# Patient Record
Sex: MALE | Race: WHITE | NOT HISPANIC OR LATINO | Employment: FULL TIME | URBAN - METROPOLITAN AREA
[De-identification: names, ages, dates, MRNs, and addresses within clinical notes are randomized per-mention and may not be internally consistent; named-entity substitution may affect disease eponyms.]

---

## 2017-07-10 ENCOUNTER — TRANSFERRED RECORDS (OUTPATIENT)
Dept: HEALTH INFORMATION MANAGEMENT | Facility: CLINIC | Age: 37
End: 2017-07-10

## 2017-10-25 ENCOUNTER — TELEPHONE (OUTPATIENT)
Dept: FAMILY MEDICINE | Facility: CLINIC | Age: 37
End: 2017-10-25

## 2017-10-25 ENCOUNTER — OFFICE VISIT (OUTPATIENT)
Dept: FAMILY MEDICINE | Facility: CLINIC | Age: 37
End: 2017-10-25
Payer: COMMERCIAL

## 2017-10-25 VITALS
DIASTOLIC BLOOD PRESSURE: 86 MMHG | OXYGEN SATURATION: 96 % | SYSTOLIC BLOOD PRESSURE: 130 MMHG | HEIGHT: 74 IN | BODY MASS INDEX: 34.78 KG/M2 | HEART RATE: 122 BPM | WEIGHT: 271 LBS | TEMPERATURE: 97.1 F

## 2017-10-25 DIAGNOSIS — A08.4 VIRAL GASTROENTERITIS: Primary | ICD-10-CM

## 2017-10-25 DIAGNOSIS — E66.811 OBESITY (BMI 30.0-34.9): ICD-10-CM

## 2017-10-25 DIAGNOSIS — Z23 NEED FOR PROPHYLACTIC VACCINATION AND INOCULATION AGAINST INFLUENZA: ICD-10-CM

## 2017-10-25 DIAGNOSIS — R03.0 ELEVATED BLOOD-PRESSURE READING WITHOUT DIAGNOSIS OF HYPERTENSION: ICD-10-CM

## 2017-10-25 PROCEDURE — 99213 OFFICE O/P EST LOW 20 MIN: CPT | Mod: 25 | Performed by: FAMILY MEDICINE

## 2017-10-25 PROCEDURE — 90686 IIV4 VACC NO PRSV 0.5 ML IM: CPT | Performed by: FAMILY MEDICINE

## 2017-10-25 PROCEDURE — 90471 IMMUNIZATION ADMIN: CPT | Performed by: FAMILY MEDICINE

## 2017-10-25 ASSESSMENT — PATIENT HEALTH QUESTIONNAIRE - PHQ9: SUM OF ALL RESPONSES TO PHQ QUESTIONS 1-9: 0

## 2017-10-25 NOTE — NURSING NOTE
"Chief Complaint   Patient presents with     Flu Symptoms     Diarrhea, vomiting yesterday morning     Letter for School/Work     Health Maintenance     Phq9, DAP, Flu shot     Flu Shot       Initial BP (!) 154/93 (BP Location: Right arm, Patient Position: Chair, Cuff Size: Adult Large)  Pulse 122  Temp 97.1  F (36.2  C) (Oral)  Ht 6' 2\" (1.88 m)  Wt 271 lb (122.9 kg)  SpO2 96%  BMI 34.79 kg/m2 Estimated body mass index is 34.79 kg/(m^2) as calculated from the following:    Height as of this encounter: 6' 2\" (1.88 m).    Weight as of this encounter: 271 lb (122.9 kg).  Medication Reconciliation: complete   Maryam Ruffin CMA       "

## 2017-10-25 NOTE — PROGRESS NOTES
"  SUBJECTIVE:   Lino Nava is a 36 year old male who presents to clinic today for the following health issues:      Acute Illness   Acute illness concerns: Diarrhea, vomiting  Onset: Yesterday    Fever: no    Chills/Sweats: no    Headache (location?): YES- yesterday afternoon    Sinus Pressure:no    Conjunctivitis:  no    Ear Pain: no    Rhinorrhea: no    Congestion: YES- Little bit    Sore Throat: no     Cough: YES - Little    Wheeze: no    Decreased Appetite: no    Nausea: no    Vomiting: YES- Yesterday morning    Diarrhea:  YES    Dysuria/Freq.: no    Fatigue/Achiness: no    Sick/Strep Exposure: YES- Daughter was sick     Therapies Tried and outcome: Tania Perez    Needs a letter for work. He missed work today and yesterday but plans to return to work tomorrow.  His work involves delivery and instillation of appliances.        Problem list and histories reviewed & adjusted, as indicated.  Additional history: as documented    Patient Active Problem List   Diagnosis     Obesity (BMI 30.0-34.9)     History reviewed. No pertinent surgical history.    Social History   Substance Use Topics     Smoking status: Former Smoker     Smokeless tobacco: Never Used     Alcohol use Yes     History reviewed. No pertinent family history.      No current outpatient prescriptions on file.     No Known Allergies      Reviewed and updated as needed this visit by clinical staffTobacco  Allergies  Med Hx  Surg Hx  Fam Hx  Soc Hx      Reviewed and updated as needed this visit by Provider         ROS:  Mainly significant for the above. Last year he was on sertraline for anxiety and depression, but he hasn't been taking that medicine now for a long time and doesn't feel like he needs it. He denies any problems with depression at this time.    OBJECTIVE:     /86 (BP Location: Left arm, Patient Position: Chair, Cuff Size: Adult Large)  Pulse 122  Temp 97.1  F (36.2  C) (Oral)  Ht 6' 2\" (1.88 m)  Wt 271 lb (122.9 " kg)  SpO2 96%  BMI 34.79 kg/m2  Body mass index is 34.79 kg/(m^2).  GENERAL: alert, no distress and over weight  ABDOMEN: Soft without any significant localizing tenderness  PSYCH: Affect is pleasant and appropriate. He scored a 0 on his PHQ-9 today.      Diagnostic Test Results:  none     ASSESSMENT/PLAN:       ICD-10-CM    1. Viral gastroenteritis A08.4    2. Elevated blood-pressure reading without diagnosis of hypertension R03.0    3. Obesity (BMI 30.0-34.9) E66.9      He appears to have a viral gastroenteritis  We discussed symptomatic treatment and dietary management for that  He was given a note for work and we will follow this expectantly    His blood pressure was also noted to be high when he first came in, but then was down by the end of the visit  His blood pressure was significantly higher today than what it normally is, but I did discuss diet and exercise treatment for that    He will remain off the sertraline    If new, worsening or persistent symptoms, the patient is to call or return for a recheck.      Bill Antunez MD  [facility]

## 2017-10-25 NOTE — TELEPHONE ENCOUNTER
Patient has not been seen by Dr. Antunez since Sept of 2016.   He saw Dr. Whyte last December for acute issue.  There is a 12:20 today with DBL, routed to Dr. Antunez to advise on writing note (TC tells me he does not want to come in).    Esha Evans RN  [facility]

## 2017-10-25 NOTE — MR AVS SNAPSHOT
"              After Visit Summary   10/25/2017    Lino Nava    MRN: [id number]           Patient Information     Date Of Birth          1980        Visit Information        Provider Department      10/25/2017 12:20 PM Bill Antunez MD [facility]        Today's Diagnoses     Viral gastroenteritis    -  1    Elevated blood-pressure reading without diagnosis of hypertension           Follow-ups after your visit        Follow-up notes from your care team     Return if symptoms worsen or fail to improve.      Who to contact     If you have questions or need follow up information about today's clinic visit or your schedule please contact [facility] directly at [phone].  Normal or non-critical lab and imaging results will be communicated to you by MyChart, letter or phone within 4 business days after the clinic has received the results. If you do not hear from us within 7 days, please contact the clinic through MyChart or phone. If you have a critical or abnormal lab result, we will notify you by phone as soon as possible.  Submit refill requests through [employer] or call your pharmacy and they will forward the refill request to us. Please allow 3 business days for your refill to be completed.          Additional Information About Your Visit        MyChart Information     [employer] lets you send messages to your doctor, view your test results, renew your prescriptions, schedule appointments and more. To sign up, go to [email or web address] . Click on \"Log in\" on the left side of the screen, which will take you to the Welcome page. Then click on \"Sign up Now\" on the right side of the page.     You will be asked to enter the access code listed below, as well as some personal information. Please follow the directions to create your username and password.     Your access code is: [email or web address]  Expires: 2018 12:53 PM     Your access code will  in 90 " "days. If you need help or a new code, please call your [facility] clinic or [phone].        Care EveryWhere ID     This is your Care EveryWhere ID. This could be used by other organizations to access your [facility] medical records  [id number]        Your Vitals Were     Pulse Temperature Height Pulse Oximetry BMI (Body Mass Index)       122 97.1  F (36.2  C) (Oral) 6' 2\" (1.88 m) 96% 34.79 kg/m2        Blood Pressure from Last 3 Encounters:   10/25/17 (!) 154/93   12/07/16 128/78   09/23/16 115/80    Weight from Last 3 Encounters:   10/25/17 271 lb (122.9 kg)   12/07/16 256 lb (116.1 kg)   09/23/16 256 lb (116.1 kg)              Today, you had the following     No orders found for display       Primary Care Provider Office Phone # Fax #    Bill Antunez [phone]       [address] [facility] [address]        Equal Access to Services     [facility]: Hadii adolfo schroeder hadasho Soomaali, waaxda luqadaha, qaybta kaalmada adeegyawes, chris grande . So [facility] [phone].    ATENCIÓN: Si habla español, tiene a everett disposición servicios gratuitos de asistencia lingüística. Llame al [phone].    We comply with applicable federal civil rights laws and [place] laws. We do not discriminate on the basis of race, color, national origin, age, disability, sex, sexual orientation, or gender identity.            Thank you!     Thank you for choosing [facility]  for your care. Our goal is always to provide you with excellent care. Hearing back from our patients is one way we can continue to improve our services. Please take a few minutes to complete the written survey that you may receive in the mail after your visit with us. Thank you!             Your Updated Medication List - Protect others around you: Learn how to safely use, store and throw away your medicines at www.disposemymeds.org.      Notice  As of 10/25/2017 12:53 PM    You have not " been prescribed any medications.

## 2017-10-25 NOTE — LETTER
[address] [address]  Phone: [phone]  Fax: [phone]    October 25, 2017        Lino Nava  [facility] [address] [facility] [address]          To whom it may concern:    RE: Lino ARAUJO Navajersey Huynh was seen in our office today with symptoms of the stomach flu (viral gastroenteritis). He missed work yesterday and today because of this, but we are hopeful he will be able to return to work tomorrow.    Thank you for your consideration.      Sincerely,        Bill Antunez MD

## 2017-10-25 NOTE — TELEPHONE ENCOUNTER
Reason for Call:  Other     Detailed comments: Patient calling stating that he missed today from work for flu symptoms. Patient would like a letter for his work to excuse his absence today. Patient will pick letter up at .    Phone Number Patient can be reached at: Cell number on file:    Telephone Information:   Mobile [phone]       Best Time: any    Can we leave a detailed message on this number? YES    Call taken on 10/25/2017 at 10:47 AM by Shannan Gutierrez

## 2017-10-25 NOTE — NURSING NOTE
Blood pressure rechecked 130/86  Check positive culture sensitivity results.  Culture results 10/25/2017

## 2017-10-25 NOTE — TELEPHONE ENCOUNTER
I called patient, he agrees to schedule at 12:20.  Says he had the vomiting flu, no longer vomiting today.  Scheduled.  Esha Evans RN  [facility]

## 2017-10-25 NOTE — PROGRESS NOTES

## 2017-12-15 ENCOUNTER — OFFICE VISIT (OUTPATIENT)
Dept: FAMILY MEDICINE | Facility: CLINIC | Age: 37
End: 2017-12-15
Payer: COMMERCIAL

## 2017-12-15 VITALS
BODY MASS INDEX: 34.67 KG/M2 | WEIGHT: 270 LBS | TEMPERATURE: 97.1 F | SYSTOLIC BLOOD PRESSURE: 124 MMHG | DIASTOLIC BLOOD PRESSURE: 80 MMHG | HEART RATE: 84 BPM | OXYGEN SATURATION: 95 %

## 2017-12-15 DIAGNOSIS — H65.05 RECURRENT ACUTE SEROUS OTITIS MEDIA OF LEFT EAR: Primary | ICD-10-CM

## 2017-12-15 PROCEDURE — 99213 OFFICE O/P EST LOW 20 MIN: CPT | Performed by: FAMILY MEDICINE

## 2017-12-15 RX ORDER — AMOXICILLIN 875 MG
875 TABLET ORAL 2 TIMES DAILY
Qty: 20 TABLET | Refills: 0 | Status: SHIPPED | OUTPATIENT
Start: 2017-12-15 | End: 2018-04-09

## 2017-12-15 NOTE — NURSING NOTE
"Chief Complaint   Patient presents with     Ear Problem     x 3 weeks,Possible fluid in left ear and can feel it into his jaw       Initial /80 (BP Location: Right arm, Patient Position: Chair, Cuff Size: Adult Large)  Pulse 84  Temp 97.1  F (36.2  C) (Oral)  Wt 270 lb (122.5 kg)  SpO2 95%  BMI 34.67 kg/m2 Estimated body mass index is 34.67 kg/(m^2) as calculated from the following:    Height as of 10/25/17: 6' 2\" (1.88 m).    Weight as of this encounter: 270 lb (122.5 kg).  Medication Reconciliation: complete   Maryam Ruffin CMA       "

## 2017-12-15 NOTE — MR AVS SNAPSHOT
"              After Visit Summary   12/15/2017    Lino Nava    MRN: [id number]           Patient Information     Date Of Birth          1980        Visit Information        Provider Department      12/15/2017 7:40 AM Bill Antunez MD [facility]        Today's Diagnoses     Recurrent acute serous otitis media of left ear    -  1       Follow-ups after your visit        Follow-up notes from your care team     Return if symptoms worsen or fail to improve.      Who to contact     If you have questions or need follow up information about today's clinic visit or your schedule please contact [facility] directly at [phone].  Normal or non-critical lab and imaging results will be communicated to you by MyChart, letter or phone within 4 business days after the clinic has received the results. If you do not hear from us within 7 days, please contact the clinic through MyChart or phone. If you have a critical or abnormal lab result, we will notify you by phone as soon as possible.  Submit refill requests through [employer] or call your pharmacy and they will forward the refill request to us. Please allow 3 business days for your refill to be completed.          Additional Information About Your Visit        MyChart Information     [employer] lets you send messages to your doctor, view your test results, renew your prescriptions, schedule appointments and more. To sign up, go to [email or web address] . Click on \"Log in\" on the left side of the screen, which will take you to the Welcome page. Then click on \"Sign up Now\" on the right side of the page.     You will be asked to enter the access code listed below, as well as some personal information. Please follow the directions to create your username and password.     Your access code is: [email or web address]  Expires: 2018 11:53 AM     Your access code will  in 90 days. If you need help or a new code, please call " your [facility] clinic or [phone].        Care EveryWhere ID     This is your Care EveryWhere ID. This could be used by other organizations to access your [facility] medical records  [id number]        Your Vitals Were     Pulse Temperature Pulse Oximetry BMI (Body Mass Index)          84 97.1  F (36.2  C) (Oral) 95% 34.67 kg/m2         Blood Pressure from Last 3 Encounters:   12/15/17 124/80   10/25/17 130/86   12/07/16 128/78    Weight from Last 3 Encounters:   12/15/17 270 lb (122.5 kg)   10/25/17 271 lb (122.9 kg)   12/07/16 256 lb (116.1 kg)              Today, you had the following     No orders found for display         Today's Medication Changes          These changes are accurate as of: 12/15/17  8:02 AM.  If you have any questions, ask your nurse or doctor.               Start taking these medicines.        Dose/Directions    amoxicillin 875 MG tablet   Commonly known as:  AMOXIL   Used for:  Recurrent acute serous otitis media of left ear   Started by:  Bill Antunez MD        Dose:  875 mg   Take 1 tablet (875 mg) by mouth 2 times daily   Quantity:  20 tablet   Refills:  0            Where to get your medicines      These medications were sent to [id number] - [facility] [address] [facility]  [address] [facility] [address]     Phone:  [phone]     amoxicillin 875 MG tablet                Primary Care Provider Office Phone # Fax #    Bill Antunez -[phone]       [address] [place], [place] [id number]        Equal Access to Services     [facility]: Hadii adolfo schroeder hadasho Somendy, waaxda luqadaha, qaybta kaalmachris rodrigues. So [facility] [phone].    ATENCIÓN: Si habla español, tiene a everett disposición servicios gratuitos de asistencia lingüística. Llame al [phone].    We comply with applicable federal civil rights laws and [place] laws. We do not discriminate on the basis of race, color,  national origin, age, disability, sex, sexual orientation, or gender identity.            Thank you!     Thank you for choosing [facility]  for your care. Our goal is always to provide you with excellent care. Hearing back from our patients is one way we can continue to improve our services. Please take a few minutes to complete the written survey that you may receive in the mail after your visit with us. Thank you!             Your Updated Medication List - Protect others around you: Learn how to safely use, store and throw away your medicines at www.disposemymeds.org.          This list is accurate as of: 12/15/17  8:02 AM.  Always use your most recent med list.                   Brand Name Dispense Instructions for use Diagnosis    amoxicillin 875 MG tablet    AMOXIL    20 tablet    Take 1 tablet (875 mg) by mouth 2 times daily    Recurrent acute serous otitis media of left ear

## 2017-12-15 NOTE — PROGRESS NOTES
SUBJECTIVE:   Lino Nava is a 37 year old male who presents to clinic today for the following health issues:      Concern - Left ear  Onset: 3 weeks    Description:   Possible fluid in ear, pressure. Can feel it into his left jaw,    Intensity: moderate    Progression of Symptoms:  same    Accompanying Signs & Symptoms:  None    Previous history of similar problem:   Yes    Precipitating factors:   Worsened by: None    Alleviating factors:  Improved by:     Therapies Tried and outcome: Cold and Sinus medication    He apparently gets this type of thing every few months or so.  Usually with his left ear.  It feels plugged and muffled.  I do note he was seen by 1 of my partners a year ago with similar complaints of the right ear.  His mother apparently has had similar problems and has had a tube in her ear in the past.  The patient has had no fever.  Not much nasal congestion or cough.    Problem list and histories reviewed & adjusted, as indicated.  Additional history: as documented    Patient Active Problem List   Diagnosis     Obesity (BMI 30.0-34.9)     History reviewed. No pertinent surgical history.    Social History   Substance Use Topics     Smoking status: Former Smoker     Smokeless tobacco: Never Used     Alcohol use Yes     History reviewed. No pertinent family history.      Current Outpatient Prescriptions   Medication Sig Dispense Refill    none   0     No Known Allergies      Reviewed and updated as needed this visit by clinical staffTobacco  Allergies  Meds  Med Hx  Surg Hx  Fam Hx  Soc Hx      Reviewed and updated as needed this visit by Provider         ROS:  Noncontributory except as above    OBJECTIVE:     /80 (BP Location: Right arm, Patient Position: Chair, Cuff Size: Adult Large)  Pulse 84  Temp 97.1  F (36.2  C) (Oral)  Wt 270 lb (122.5 kg)  SpO2 95%  BMI 34.67 kg/m2  Body mass index is 34.67 kg/(m^2).  GENERAL: alert, no distress and over weight  HENT: Both ear canals  "are clear.  The external ears are nontender. The right TM appears to show some chronic changes, but has no erythema or obvious fluid seen.  The left TM is somewhat dull and slightly pink.  Probable fluid behind the TM.  NECK: Supple    Diagnostic Test Results:  none     ASSESSMENT/PLAN:         ICD-10-CM    1. Recurrent acute serous otitis media of left ear H65.05 amoxicillin (AMOXIL) 875 MG tablet     We will try a 10 day course of an antibiotic for this  Continue use of a decongestant or other \"sinus\" medication    If symptoms do not clear, then call and I will make an ENT referral for him for further evaluation and treatment    Bill Antunez MD  [facility]  "

## 2018-04-09 ENCOUNTER — OFFICE VISIT (OUTPATIENT)
Dept: FAMILY MEDICINE | Facility: CLINIC | Age: 38
End: 2018-04-09
Payer: COMMERCIAL

## 2018-04-09 VITALS
TEMPERATURE: 97.6 F | DIASTOLIC BLOOD PRESSURE: 80 MMHG | SYSTOLIC BLOOD PRESSURE: 131 MMHG | HEIGHT: 74 IN | HEART RATE: 105 BPM | BODY MASS INDEX: 33.62 KG/M2 | WEIGHT: 262 LBS

## 2018-04-09 DIAGNOSIS — F33.0 MILD EPISODE OF RECURRENT MAJOR DEPRESSIVE DISORDER (H): Primary | ICD-10-CM

## 2018-04-09 DIAGNOSIS — F41.9 ANXIETY: ICD-10-CM

## 2018-04-09 PROCEDURE — 99213 OFFICE O/P EST LOW 20 MIN: CPT | Performed by: PHYSICIAN ASSISTANT

## 2018-04-09 ASSESSMENT — PATIENT HEALTH QUESTIONNAIRE - PHQ9: 5. POOR APPETITE OR OVEREATING: SEVERAL DAYS

## 2018-04-09 ASSESSMENT — ANXIETY QUESTIONNAIRES
IF YOU CHECKED OFF ANY PROBLEMS ON THIS QUESTIONNAIRE, HOW DIFFICULT HAVE THESE PROBLEMS MADE IT FOR YOU TO DO YOUR WORK, TAKE CARE OF THINGS AT HOME, OR GET ALONG WITH OTHER PEOPLE: SOMEWHAT DIFFICULT
6. BECOMING EASILY ANNOYED OR IRRITABLE: SEVERAL DAYS
5. BEING SO RESTLESS THAT IT IS HARD TO SIT STILL: SEVERAL DAYS
1. FEELING NERVOUS, ANXIOUS, OR ON EDGE: SEVERAL DAYS
2. NOT BEING ABLE TO STOP OR CONTROL WORRYING: SEVERAL DAYS
7. FEELING AFRAID AS IF SOMETHING AWFUL MIGHT HAPPEN: NOT AT ALL
3. WORRYING TOO MUCH ABOUT DIFFERENT THINGS: SEVERAL DAYS
GAD7 TOTAL SCORE: 6

## 2018-04-09 NOTE — LETTER
My Depression Action Plan  Name: Lino Nava   Date of Birth 1980  Date: 4/9/2018    My doctor: Bill Antunez   My clinic: [address] [id number] [phone]          GREEN    ZONE   Good Control    What it looks like:     Things are going generally well. You have normal up s and down s. You may even feel depressed from time to time, but bad moods usually last less than a day.   What you need to do:  1. Continue to care for yourself (see self care plan)  2. Check your depression survival kit and update it as needed  3. Follow your physician s recommendations including any medication.  4. Do not stop taking medication unless you consult with your physician first.           YELLOW         ZONE Getting Worse    What it looks like:     Depression is starting to interfere with your life.     It may be hard to get out of bed; you may be starting to isolate yourself from others.    Symptoms of depression are starting to last most all day and this has happened for several days.     You may have suicidal thoughts but they are not constant.   What you need to do:     1. Call your care team, your response to treatment will improve if you keep your care team informed of your progress. Yellow periods are signs an adjustment may need to be made.     2. Continue your self-care, even if you have to fake it!    3. Talk to someone in your support network    4. Open up your depression survival kit           RED    ZONE Medical Alert - Get Help    What it looks like:     Depression is seriously interfering with your life.     You may experience these or other symptoms: You can t get out of bed most days, can t work or engage in other necessary activities, you have trouble taking care of basic hygiene, or basic responsibilities, thoughts of suicide or death that will not go away, self-injurious behavior.     What you need to  do:  1. Call your care team and request a same-day appointment. If they are not available (weekends or after hours) call your local crisis line, emergency room or 911.            Depression Self Care Plan / Survival Kit    Self-Care for Depression  Here s the deal. Your body and mind are really not as separate as most people think.  What you do and think affects how you feel and how you feel influences what you do and think. This means if you do things that people who feel good do, it will help you feel better.  Sometimes this is all it takes.  There is also a place for medication and therapy depending on how severe your depression is, so be sure to consult with your medical provider and/ or Behavioral Health Consultant if your symptoms are worsening or not improving.     In order to better manage my stress, I will:    Exercise  Get some form of exercise, every day. This will help reduce pain and release endorphins, the  feel good  chemicals in your brain. This is almost as good as taking antidepressants!  This is not the same as joining a gym and then never going! (they count on that by the way ) It can be as simple as just going for a walk or doing some gardening, anything that will get you moving.      Hygiene   Maintain good hygiene (Get out of bed in the morning, Make your bed, Brush your teeth, Take a shower, and Get dressed like you were going to work, even if you are unemployed).  If your clothes don't fit try to get ones that do.    Diet  I will strive to eat foods that are good for me, drink plenty of water, and avoid excessive sugar, caffeine, alcohol, and other mood-altering substances.  Some foods that are helpful in depression are: complex carbohydrates, B vitamins, flaxseed, fish or fish oil, fresh fruits and vegetables.    Psychotherapy  I agree to participate in Individual Therapy (if recommended).    Medication  If prescribed medications, I agree to take them.  Missing doses can result in serious  side effects.  I understand that drinking alcohol, or other illicit drug use, may cause potential side effects.  I will not stop my medication abruptly without first discussing it with my provider.    Staying Connected With Others  I will stay in touch with my friends, family members, and my primary care provider/team.    Use your imagination  Be creative.  We all have a creative side; it doesn t matter if it s oil painting, sand castles, or mud pies! This will also kick up the endorphins.    Witness Beauty  (AKA stop and smell the roses) Take a look outside, even in mid-winter. Notice colors, textures. Watch the squirrels and birds.     Service to others  Be of service to others.  There is always someone else in need.  By helping others we can  get out of ourselves  and remember the really important things.  This also provides opportunities for practicing all the other parts of the program.    Humor  Laugh and be silly!  Adjust your TV habits for less news and crime-drama and more comedy.    Control your stress  Try breathing deep, massage therapy, biofeedback, and meditation. Find time to relax each day.     My support system    Clinic Contact:  Phone number:    Contact 1:  Phone number:    Contact 2:  Phone number:    Jehovah's witness/:  Phone number:    Therapist:  Phone number:    Local crisis center:    Phone number:    Other community support:  Phone number:

## 2018-04-09 NOTE — NURSING NOTE
"Chief Complaint   Patient presents with     Depression       Initial /80 (BP Location: Right arm, Patient Position: Sitting, Cuff Size: Adult Large)  Pulse 105  Temp 97.6  F (36.4  C) (Oral)  Ht 6' 2\" (1.88 m)  Wt 262 lb (118.8 kg)  BMI 33.64 kg/m2 Estimated body mass index is 33.64 kg/(m^2) as calculated from the following:    Height as of this encounter: 6' 2\" (1.88 m).    Weight as of this encounter: 262 lb (118.8 kg).  Medication Reconciliation: complete  Virginia Mcwilliams MA    "

## 2018-04-09 NOTE — PROGRESS NOTES
"  SUBJECTIVE:   Lino Nava is a 37 year old male who presents to clinic today for the following health issues:        Abnormal Mood Symptoms  Onset: 2 week     Description:   Depression: YES  Anxiety: YES    Accompanying Signs & Symptoms:  Still participating in activities that you used to enjoy: no  Fatigue: YES  Irritability: YES  Difficulty concentrating: YES- sometimes  Changes in appetite: no   Problems with sleep: YES  Heart racing/beating fast : YES  Thoughts of hurting yourself or others: none    History:   Recent stress: YES- wife pregnant with 3 child -due in Aug.    Prior depression hospitalization: None  Family history of depression: YES  Family history of anxiety: YES    Precipitating factors:   Alcohol/drug use: no     Alleviating factors:  None     Therapies Tried and outcome: patient was on Zoloft 2 years ago.  And other times in the past.    Was on it for similar symptoms.  Helped.    Missed work recently due to anxiety.  No panic attacks.    Has seen therapist.  Might go back to old one.  Did help.          Problem list and histories reviewed & adjusted, as indicated.  Additional history: as documented    Patient Active Problem List   Diagnosis     Obesity (BMI 30.0-34.9)     History reviewed. No pertinent surgical history.    Social History   Substance Use Topics     Smoking status: Former Smoker     Smokeless tobacco: Never Used     Alcohol use Yes     History reviewed. No pertinent family history.        Reviewed and updated as needed this visit by clinical staff  Tobacco  Allergies  Meds  Med Hx  Surg Hx  Fam Hx  Soc Hx      Reviewed and updated as needed this visit by Provider  Allergies         ROS:  As above    OBJECTIVE:     /80 (BP Location: Right arm, Patient Position: Sitting, Cuff Size: Adult Large)  Pulse 105  Temp 97.6  F (36.4  C) (Oral)  Ht 6' 2\" (1.88 m)  Wt 262 lb (118.8 kg)  BMI 33.64 kg/m2  Body mass index is 33.64 kg/(m^2).  GENERAL: healthy, alert and " no distress  PSYCH: mentation appears normal, affect normal/bright    Diagnostic Test Results:  none     ASSESSMENT/PLAN:       1. Mild episode of recurrent major depressive disorder (H)  Restart.  Encouraged pt to take for at least one year if it helps.    - sertraline (ZOLOFT) 50 MG tablet; Take 1/2 tablet (25 mg) for 1, then increase to one full tab for 2 weeks then take 2 tabs daily  Dispense: 30 tablet; Refill: 1    2. Anxiety  As above  - sertraline (ZOLOFT) 50 MG tablet; Take 1/2 tablet (25 mg) for 1, then increase to one full tab for 2 weeks then take 2 tabs daily  Dispense: 30 tablet; Refill: 1    FUTURE APPOINTMENTS:       - Follow-up office or telephone visit in 4 weeks     Anamaria Lynn PA-C  [facility]

## 2018-04-09 NOTE — PATIENT INSTRUCTIONS
Can be a telephone visit   Billing/ Insurance    There will be a charge that will be billed to your insurance company. If your insurance does not cover it, it will be billed to you. It is charged based on the time spent on the telephone with you in increments of 10 minutes of medical discussion. You may wish to check with your insurance company to see if they cover telephone visits.    Cost of Phone Visits/ E-Visits    5 to 10 minutes is $30.00  11 to 20 minutes is $59.00  21 to 30 minutes is $85.00     E-Visit is $35.00    Before Visit    Before your visit with the provider, a staff person will call you to obtain a second verbal consent, verify your insurance company, home address, and phone number. We will also need to update your medication list and allergies.    In the end, if you wish to see the provider in the office instead, we can help you make your in person appointment. Through [employer], you can also send and E-Visit Request and the provider will respond to you within 24 hours.

## 2018-04-09 NOTE — MR AVS SNAPSHOT
After Visit Summary   4/9/2018    Lino Nava    MRN: [id number]           Patient Information     Date Of Birth          1980        Visit Information        Provider Department      4/9/2018 1:20 PM Anamaria Lynn PA-C [facility]        Today's Diagnoses     Mild episode of recurrent major depressive disorder (H)    -  1    Anxiety          Care Instructions    Can be a telephone visit   Billing/ Insurance    There will be a charge that will be billed to your insurance company. If your insurance does not cover it, it will be billed to you. It is charged based on the time spent on the telephone with you in increments of 10 minutes of medical discussion. You may wish to check with your insurance company to see if they cover telephone visits.    Cost of Phone Visits/ E-Visits    5 to 10 minutes is $30.00  11 to 20 minutes is $59.00  21 to 30 minutes is $85.00     E-Visit is $35.00    Before Visit    Before your visit with the provider, a staff person will call you to obtain a second verbal consent, verify your insurance company, home address, and phone number. We will also need to update your medication list and allergies.    In the end, if you wish to see the provider in the office instead, we can help you make your in person appointment. Through [employer], you can also send and E-Visit Request and the provider will respond to you within 24 hours.            Follow-ups after your visit        Follow-up notes from your care team     Return in about 4 weeks (around 5/7/2018) for mood.      Who to contact     If you have questions or need follow up information about today's clinic visit or your schedule please contact [facility] directly at [phone].  Normal or non-critical lab and imaging results will be communicated to you by MyChart, letter or phone within 4 business days after the clinic has received the results. If you do not hear  "from us within 7 days, please contact the clinic through [employer] or phone. If you have a critical or abnormal lab result, we will notify you by phone as soon as possible.  Submit refill requests through [employer] or call your pharmacy and they will forward the refill request to us. Please allow 3 business days for your refill to be completed.          Additional Information About Your Visit        [employer] Information     [employer] lets you send messages to your doctor, view your test results, renew your prescriptions, schedule appointments and more. To sign up, go to [email or web address] . Click on \"Log in\" on the left side of the screen, which will take you to the Welcome page. Then click on \"Sign up Now\" on the right side of the page.     You will be asked to enter the access code listed below, as well as some personal information. Please follow the directions to create your username and password.     Your access code is: [email or web address]  Expires: 2018  2:13 PM     Your access code will  in 90 days. If you need help or a new code, please call your [place] clinic or [phone].        Care EveryWhere ID     This is your Care EveryWhere ID. This could be used by other organizations to access your [place] medical records  [id number]        Your Vitals Were     Pulse Temperature Height BMI (Body Mass Index)          105 97.6  F (36.4  C) (Oral) 6' 2\" (1.88 m) 33.64 kg/m2         Blood Pressure from Last 3 Encounters:   18 131/80   12/15/17 124/80   10/25/17 130/86    Weight from Last 3 Encounters:   18 262 lb (118.8 kg)   12/15/17 270 lb (122.5 kg)   10/25/17 271 lb (122.9 kg)              Today, you had the following     No orders found for display         Today's Medication Changes          These changes are accurate as of 18  2:13 PM.  If you have any questions, ask your nurse or doctor.               Start taking these medicines.        Dose/Directions    sertraline 50 MG tablet "   Commonly known as:  ZOLOFT   Used for:  Mild episode of recurrent major depressive disorder (H), Anxiety   Started by:  Anamaria Lynn PA-C        Take 1/2 tablet (25 mg) for 1, then increase to one full tab for 2 weeks then take 2 tabs daily   Quantity:  30 tablet   Refills:  1            Where to get your medicines      These medications were sent to [id number] - [place], [place] - [address]  [address], [place] [address]     Phone:  [phone]     sertraline 50 MG tablet                Primary Care Provider Office Phone # Fax #    Bill Antunez [phone] [address] [address]        Equal Access to Services     INES JONES : Hadii aad ku hadasho Soomaali, waaxda luqadaha, qaybta kaalmada adeegyada, chris garnica. So [facility] [phone].    ATENCIÓN: Si habla español, tiene a everett disposición servicios gratuitos de asistencia lingüística. [facility] [phone].    We comply with applicable federal civil rights laws and [place] laws. We do not discriminate on the basis of race, color, national origin, age, disability, sex, sexual orientation, or gender identity.            Thank you!     Thank you for choosing [facility]  for your care. Our goal is always to provide you with excellent care. Hearing back from our patients is one way we can continue to improve our services. Please take a few minutes to complete the written survey that you may receive in the mail after your visit with us. Thank you!             Your Updated Medication List - Protect others around you: Learn how to safely use, store and throw away your medicines at www.disposemymeds.org.          This list is accurate as of 4/9/18  2:13 PM.  Always use your most recent med list.                   Brand Name Dispense Instructions for use Diagnosis    sertraline 50 MG tablet    ZOLOFT    30 tablet    Take  1/2 tablet (25 mg) for 1, then increase to one full tab for 2 weeks then take 2 tabs daily    Mild episode of recurrent major depressive disorder (H), Anxiety

## 2018-04-10 ASSESSMENT — PATIENT HEALTH QUESTIONNAIRE - PHQ9: SUM OF ALL RESPONSES TO PHQ QUESTIONS 1-9: 7

## 2018-04-10 ASSESSMENT — ANXIETY QUESTIONNAIRES: GAD7 TOTAL SCORE: 6

## 2018-06-29 ENCOUNTER — OFFICE VISIT (OUTPATIENT)
Dept: FAMILY MEDICINE | Facility: CLINIC | Age: 38
End: 2018-06-29
Payer: COMMERCIAL

## 2018-06-29 VITALS
HEIGHT: 74 IN | TEMPERATURE: 97.5 F | OXYGEN SATURATION: 97 % | DIASTOLIC BLOOD PRESSURE: 80 MMHG | HEART RATE: 119 BPM | SYSTOLIC BLOOD PRESSURE: 123 MMHG | WEIGHT: 253 LBS | BODY MASS INDEX: 32.47 KG/M2

## 2018-06-29 DIAGNOSIS — E66.811 OBESITY (BMI 30.0-34.9): ICD-10-CM

## 2018-06-29 DIAGNOSIS — Z85.6 HISTORY OF LEUKEMIA: ICD-10-CM

## 2018-06-29 DIAGNOSIS — Z11.4 SCREENING FOR HIV (HUMAN IMMUNODEFICIENCY VIRUS): ICD-10-CM

## 2018-06-29 DIAGNOSIS — B36.0 TINEA VERSICOLOR: ICD-10-CM

## 2018-06-29 DIAGNOSIS — Z00.00 ROUTINE GENERAL MEDICAL EXAMINATION AT A HEALTH CARE FACILITY: Primary | ICD-10-CM

## 2018-06-29 DIAGNOSIS — F32.0 MILD MAJOR DEPRESSION (H): ICD-10-CM

## 2018-06-29 DIAGNOSIS — F41.1 GAD (GENERALIZED ANXIETY DISORDER): ICD-10-CM

## 2018-06-29 PROCEDURE — 99395 PREV VISIT EST AGE 18-39: CPT | Performed by: FAMILY MEDICINE

## 2018-06-29 PROCEDURE — 99213 OFFICE O/P EST LOW 20 MIN: CPT | Mod: 25 | Performed by: FAMILY MEDICINE

## 2018-06-29 RX ORDER — KETOCONAZOLE 200 MG/1
200 TABLET ORAL DAILY
Qty: 7 TABLET | Refills: 1 | Status: SHIPPED | OUTPATIENT
Start: 2018-06-29 | End: 2019-01-22

## 2018-06-29 ASSESSMENT — ANXIETY QUESTIONNAIRES
7. FEELING AFRAID AS IF SOMETHING AWFUL MIGHT HAPPEN: NOT AT ALL
3. WORRYING TOO MUCH ABOUT DIFFERENT THINGS: SEVERAL DAYS
5. BEING SO RESTLESS THAT IT IS HARD TO SIT STILL: NOT AT ALL
GAD7 TOTAL SCORE: 2
6. BECOMING EASILY ANNOYED OR IRRITABLE: SEVERAL DAYS
1. FEELING NERVOUS, ANXIOUS, OR ON EDGE: NOT AT ALL
2. NOT BEING ABLE TO STOP OR CONTROL WORRYING: NOT AT ALL

## 2018-06-29 ASSESSMENT — PATIENT HEALTH QUESTIONNAIRE - PHQ9: 5. POOR APPETITE OR OVEREATING: NOT AT ALL

## 2018-06-29 NOTE — PROGRESS NOTES
SUBJECTIVE:   CC: Lino Nava is an 37 year old male who presents for a preventative health visit and follow-up on some baseline health conditions.     Physical   Annual:     Getting at least 3 servings of Calcium per day:  Yes    Bi-annual eye exam:  Yes    Dental care twice a year:  Yes    Sleep apnea or symptoms of sleep apnea:  None    Diet:  Regular (no restrictions)    Frequency of exercise:  None    Taking medications regularly:  Yes    Medication side effects:  None    Additional concerns today:  No    He has been on sertraline for a couple of months for anxiety and depression.  He is staying at the 50 mg dose.  He feels like it is helpful.  He is feeling less anxious and depressed, although he still does get somewhat saha at times.  He gets a rash on his chest and back every summer and he wanted me to check that out.  It gets more pronounced when he gets hot.  He reports a history of having leukemia as a child.  He would like to check some blood counts to make sure he is clear of that.  He and his wife are expecting their third child in August.      Today's PHQ-2 Score:   PHQ-2 ( 1999 Pf[employer]) 6/29/2018   Q1: Little interest or pleasure in doing things 0   Q2: Feeling down, depressed or hopeless 0   PHQ-2 Score 0   Q1: Little interest or pleasure in doing things Not at all   Q2: Feeling down, depressed or hopeless Not at all   PHQ-2 Score 0       Abuse: Current or Past(Physical, Sexual or Emotional)- No  Do you feel safe in your environment - Yes    Social History   Substance Use Topics     Smoking status: Former Smoker     Smokeless tobacco: Never Used     Alcohol use Yes     Alcohol Use 6/29/2018   If you drink alcohol do you typically have greater than 3 drinks per day OR greater than 7 drinks per week? No       Last PSA: No results found for: PSA    Reviewed orders with patient. Reviewed health maintenance and updated orders accordingly - Yes  Patient Active Problem List   Diagnosis     Obesity  "(BMI 30.0-34.9)     Mild major depression (H)     KADE (generalized anxiety disorder)     History of leukemia     History reviewed. No pertinent surgical history.    Social History   Substance Use Topics     Smoking status: Former Smoker     Smokeless tobacco: Never Used     Alcohol use Yes     History reviewed. No pertinent family history.      Current Outpatient Prescriptions   Medication Sig Dispense Refill       1     sertraline (ZOLOFT) 50 MG tablet Take 1 tab daily 30 tablet 4        1     No Known Allergies    Reviewed and updated as needed this visit by clinical staff         Reviewed and updated as needed this visit by Provider            Review of Systems  CONSTITUTIONAL: NEGATIVE for fever, chills, change in weight  INTEGUMENTARY/SKIN: See above  EYES: NEGATIVE for vision changes or irritation  ENT: NEGATIVE for ear, mouth and throat problems  RESP: NEGATIVE for significant cough or SOB  CV: NEGATIVE for chest pain, palpitations or peripheral edema  GI: NEGATIVE for nausea, abdominal pain, heartburn, or change in bowel habits   male: negative for dysuria, hematuria, decreased urinary stream, erectile dysfunction, urethral discharge  MUSCULOSKELETAL: NEGATIVE for significant arthralgias or myalgia  NEURO: NEGATIVE for weakness, dizziness or paresthesias  PSYCHIATRIC: See above    OBJECTIVE:   /80 (BP Location: Right arm, Patient Position: Chair, Cuff Size: Adult Regular)  Pulse 119  Temp 97.5  F (36.4  C) (Oral)  Ht 6' 2\" (1.88 m)  Wt 253 lb (114.8 kg)  SpO2 97%  BMI 32.48 kg/m2    Physical Exam  GENERAL: alert, no distress and obese  EYES: Eyes grossly normal to inspection, PERRL and conjunctivae and sclerae normal  HENT: ear canals and TM's normal, nose and mouth without ulcers or lesions  NECK: no adenopathy, no asymmetry, masses, or scars and thyroid normal to palpation  RESP: lungs clear to auscultation - no rales, rhonchi or wheezes  CV: regular rate and rhythm, normal S1 S2, no S3 or " "S4, no murmur, click or rub, no peripheral edema and peripheral pulses strong  ABDOMEN: soft, nontender, no hepatosplenomegaly, no masses and bowel sounds normal  MS: no gross musculoskeletal defects noted, no edema  SKIN: he has a salmon colored macular rash scattered over his chest and back with various circular and oval shaped patches consistent with tinea versicolor  NEURO: Normal strength and tone, mentation intact and speech normal  PSYCH: mentation appears normal, affect normal/bright.  He scored a 2 on the KADE 7 and a 0 on the PHQ 9    Diagnostic Test Results:  none     ASSESSMENT/PLAN:       ICD-10-CM    1. Routine general medical examination at a health care facility Z00.00 Lipid panel reflex to direct LDL Fasting     Basic metabolic panel     Hemoglobin A1c     CBC with platelets   2. Mild major depression (H) F32.0 sertraline (ZOLOFT) 50 MG tablet   3. KADE (generalized anxiety disorder) F41.1 sertraline (ZOLOFT) 50 MG tablet   4. Tinea versicolor B36.0 ketoconazole (NIZORAL) 200 MG tablet   5. Obesity (BMI 30.0-34.9) E66.9    6. Screening for HIV (human immunodeficiency virus) Z11.4 HIV Screening   7. History of leukemia Z85.6      We discussed the above items   We will have him return soon for fasting labs as above  Continue same sertraline at 50 mg a day  Ketoconazole 200 mg daily for 1 week to treat the tinea versicolor  Diet and exercise treatment for weight loss  Plan a recheck of the anxiety and depression in 4 months    COUNSELING:   Reviewed preventive health counseling, as reflected in patient instructions       Regular exercise       Healthy diet/nutrition    BP Readings from Last 1 Encounters:   04/09/18 131/80     Estimated body mass index is 33.64 kg/(m^2) as calculated from the following:    Height as of 4/9/18: 6' 2\" (1.88 m).    Weight as of 4/9/18: 262 lb (118.8 kg).      Weight management plan: Discussed healthy diet and exercise guidelines and patient will follow up in 4 months in " clinic to re-evaluate.     reports that he has quit smoking. He has never used smokeless tobacco.      Counseling Resources:  ATP IV Guidelines  Pooled Cohorts Equation Calculator  FRAX Risk Assessment  ICSI Preventive Guidelines  Dietary Guidelines for Americans, 2010  USDA's MyPlate  ASA Prophylaxis  Lung CA Screening    Bill Antunez MD  [facility]      Answers for HPI/ROS submitted by the patient on 6/29/2018   PHQ-2 Score: 0

## 2018-06-29 NOTE — MR AVS SNAPSHOT
After Visit Summary   6/29/2018    Lino Nava    MRN: [id number]           Patient Information     Date Of Birth          1980        Visit Information        Provider Department      6/29/2018 7:40 AM Bill Antunez MD [facility]        Today's Diagnoses     Routine general medical examination at a health care facility    -  1    Mild major depression (H)        KADE (generalized anxiety disorder)        Tinea versicolor        Obesity (BMI 30.0-34.9)        Screening for HIV (human immunodeficiency virus)        History of leukemia          Care Instructions      Preventive Health Recommendations  Male Ages 26 - 39    Yearly exam:             See your health care provider every year in order to  o   Review health changes.   o   Discuss preventive care.    o   Review your medicines if your doctor has prescribed any.    You should be tested each year for STDs (sexually transmitted diseases), if you re at risk.     After age 35, talk to your provider about cholesterol testing. If you are at risk for heart disease, have your cholesterol tested at least every 5 years.     If you are at risk for diabetes, you should have a diabetes test (fasting glucose).  Shots: Get a flu shot each year. Get a tetanus shot every 10 years.     Nutrition:    Eat at least 5 servings of fruits and vegetables daily.     Eat whole-grain bread, whole-wheat pasta and brown rice instead of white grains and rice.     Get adequate Calcium and Vitamin D.     Lifestyle    Exercise for at least 150 minutes a week (30 minutes a day, 5 days a week). This will help you control your weight and prevent disease.     Limit alcohol to one drink per day.     No smoking.     Wear sunscreen to prevent skin cancer.     See your dentist every six months for an exam and cleaning.             Follow-ups after your visit        Follow-up notes from your care team     Return in about 4 months (around 10/29/2018).       Your next 10 appointments already scheduled     Jul 03, 2018  7:15 AM CDT   LAB with CP LAB   [facility]    [address] [facility] [id number] [phone]           Please do not eat 10-12 hours before your appointment if you are coming in fasting for labs on lipids, cholesterol, or glucose (sugar). This does not apply to pregnant women. Water, hot tea and black coffee (with nothing added) are okay. Do not drink other fluids, diet soda or chew gum.              Future tests that were ordered for you today     Open Future Orders        Priority Expected Expires Ordered    Hemoglobin A1c Routine 7/3/2018 7/31/2018 6/29/2018    CBC with platelets Routine 7/3/2018 7/31/2018 6/29/2018    HIV Screening Routine 7/3/2018 7/31/2018 6/29/2018    Lipid panel reflex to direct LDL Fasting Routine 7/3/2018 7/31/2018 6/29/2018    Basic metabolic panel Routine 7/3/2018 7/31/2018 6/29/2018            Who to contact     If you have questions or need follow up information about today's clinic visit or your schedule please contact [facility] directly at [phone].  Normal or non-critical lab and imaging results will be communicated to you by MyChart, letter or phone within 4 business days after the clinic has received the results. If you do not hear from us within 7 days, please contact the clinic through "[employer]hart or phone. If you have a critical or abnormal lab result, we will notify you by phone as soon as possible.  Submit refill requests through [employer] or call your pharmacy and they will forward the refill request to us. Please allow 3 business days for your refill to be completed.          Additional Information About Your Visit        "[employer]hart Information     [employer] lets you send messages to your doctor, view your test results, renew your prescriptions, schedule appointments and more. To sign up, go to  [email or web address] . Click on \"Log in\" on the left side of the screen, which will take you to the Welcome page. Then click on \"Sign up Now\" on the right side of the page.     You will be asked to enter the access code listed below, as well as some personal information. Please follow the directions to create your username and password.     Your access code is: [email or web address]  Expires: 2018  2:13 PM     Your access code will  in 90 days. If you need help or a new code, please call your [facility] clinic or [phone].        Care EveryWhere ID     This is your Care EveryWhere ID. This could be used by other organizations to access your [facility] medical records  [id number]        Your Vitals Were     Pulse Temperature Height Pulse Oximetry BMI (Body Mass Index)       119 97.5  F (36.4  C) (Oral) 6' 2\" (1.88 m) 97% 32.48 kg/m2        Blood Pressure from Last 3 Encounters:   18 123/80   18 131/80   12/15/17 124/80    Weight from Last 3 Encounters:   18 253 lb (114.8 kg)   18 262 lb (118.8 kg)   12/15/17 270 lb (122.5 kg)                 Today's Medication Changes          These changes are accurate as of 18  8:15 AM.  If you have any questions, ask your nurse or doctor.               Start taking these medicines.        Dose/Directions    ketoconazole 200 MG tablet   Commonly known as:  NIZORAL   Used for:  Tinea versicolor   Started by:  Bill Antunez MD        Dose:  200 mg   Take 1 tablet (200 mg) by mouth daily   Quantity:  7 tablet   Refills:  1         These medicines have changed or have updated prescriptions.        Dose/Directions    sertraline 50 MG tablet   Commonly known as:  ZOLOFT   This may have changed:  additional instructions   Used for:  Mild major depression (H), KADE (generalized anxiety disorder)   Changed by:  Bill Antunez MD        Take 1 tab daily   Quantity:  30 tablet   Refills:  4            Where to get your medicines      These medications " were sent to [id number] - [place], [place] - [address]  [address], [facility] [address]     Phone:  [phone]     ketoconazole 200 MG tablet    sertraline 50 MG tablet                Primary Care Provider Office Phone # Fax #    Bill Antunez [phone]       [address] [facility] [address]        Equal Access to Services     INES JONES : Hadii aad ku hadasho Soomaali, waaxda luqadaha, qaybta kaalmada adeegyada, waxay idiin hayaan adeeg kharash lanicol garnica. So [facility] [phone].    ATENCIÓN: Si roger espkumar, tiene a everett disposición servicios gratuitos de asistencia lingüística. Sera al [phone].    We comply with applicable federal civil rights laws and [place] laws. We do not discriminate on the basis of race, color, national origin, age, disability, sex, sexual orientation, or gender identity.            Thank you!     Thank you for choosing [facility]  for your care. Our goal is always to provide you with excellent care. Hearing back from our patients is one way we can continue to improve our services. Please take a few minutes to complete the written survey that you may receive in the mail after your visit with us. Thank you!             Your Updated Medication List - Protect others around you: Learn how to safely use, store and throw away your medicines at www.disposemymeds.org.          This list is accurate as of 6/29/18  8:15 AM.  Always use your most recent med list.                   Brand Name Dispense Instructions for use Diagnosis    ketoconazole 200 MG tablet    NIZORAL    7 tablet    Take 1 tablet (200 mg) by mouth daily    Tinea versicolor       sertraline 50 MG tablet    ZOLOFT    30 tablet    Take 1 tab daily    Mild major depression (H), KADE (generalized anxiety disorder)

## 2018-06-30 ASSESSMENT — PATIENT HEALTH QUESTIONNAIRE - PHQ9: SUM OF ALL RESPONSES TO PHQ QUESTIONS 1-9: 0

## 2018-06-30 ASSESSMENT — ANXIETY QUESTIONNAIRES: GAD7 TOTAL SCORE: 2

## 2018-07-03 DIAGNOSIS — Z11.4 SCREENING FOR HIV (HUMAN IMMUNODEFICIENCY VIRUS): ICD-10-CM

## 2018-07-03 DIAGNOSIS — Z00.00 ROUTINE GENERAL MEDICAL EXAMINATION AT A HEALTH CARE FACILITY: ICD-10-CM

## 2018-07-03 LAB
ANION GAP SERPL CALCULATED.3IONS-SCNC: 8 MMOL/L (ref 3–14)
BUN SERPL-MCNC: 19 MG/DL (ref 7–30)
CALCIUM SERPL-MCNC: 8.5 MG/DL (ref 8.5–10.1)
CHLORIDE SERPL-SCNC: 107 MMOL/L (ref 94–109)
CHOLEST SERPL-MCNC: 158 MG/DL
CO2 SERPL-SCNC: 26 MMOL/L (ref 20–32)
CREAT SERPL-MCNC: 0.73 MG/DL (ref 0.66–1.25)
ERYTHROCYTE [DISTWIDTH] IN BLOOD BY AUTOMATED COUNT: 12.7 % (ref 10–15)
GFR SERPL CREATININE-BSD FRML MDRD: >90 ML/MIN/1.7M2
GLUCOSE SERPL-MCNC: 93 MG/DL (ref 70–99)
HBA1C MFR BLD: 4.9 % (ref 0–5.6)
HCT VFR BLD AUTO: 44.5 % (ref 40–53)
HDLC SERPL-MCNC: 44 MG/DL
HGB BLD-MCNC: 15.7 G/DL (ref 13.3–17.7)
HIV 1+2 AB+HIV1 P24 AG SERPL QL IA: NONREACTIVE
LDLC SERPL CALC-MCNC: 97 MG/DL
MCH RBC QN AUTO: 31.7 PG (ref 26.5–33)
MCHC RBC AUTO-ENTMCNC: 35.3 G/DL (ref 31.5–36.5)
MCV RBC AUTO: 90 FL (ref 78–100)
NONHDLC SERPL-MCNC: 114 MG/DL
PLATELET # BLD AUTO: 204 10E9/L (ref 150–450)
POTASSIUM SERPL-SCNC: 3.9 MMOL/L (ref 3.4–5.3)
RBC # BLD AUTO: 4.95 10E12/L (ref 4.4–5.9)
SODIUM SERPL-SCNC: 141 MMOL/L (ref 133–144)
TRIGL SERPL-MCNC: 87 MG/DL
WBC # BLD AUTO: 4.3 10E9/L (ref 4–11)

## 2018-07-03 PROCEDURE — 80048 BASIC METABOLIC PNL TOTAL CA: CPT | Performed by: FAMILY MEDICINE

## 2018-07-03 PROCEDURE — 36415 COLL VENOUS BLD VENIPUNCTURE: CPT | Performed by: FAMILY MEDICINE

## 2018-07-03 PROCEDURE — 87389 HIV-1 AG W/HIV-1&-2 AB AG IA: CPT | Performed by: FAMILY MEDICINE

## 2018-07-03 PROCEDURE — 85027 COMPLETE CBC AUTOMATED: CPT | Performed by: FAMILY MEDICINE

## 2018-07-03 PROCEDURE — 83036 HEMOGLOBIN GLYCOSYLATED A1C: CPT | Performed by: FAMILY MEDICINE

## 2018-07-03 PROCEDURE — 80061 LIPID PANEL: CPT | Performed by: FAMILY MEDICINE

## 2018-07-03 NOTE — LETTER
[facility]  [address] [place]  [address]  [phone]        July 5, 2018    Lino Nava  [address] [facility] [address]        Dear Lino,    These labs are all nice and normal, including cholesterol values, kidney tests, HIV test, blood counts, and diabetes testing.     Results for orders placed or performed in visit on 07/03/18   HIV Screening   Result Value Ref Range    HIV Antigen Antibody Combo Nonreactive NR^Nonreactive       Lipid panel reflex to direct LDL Fasting   Result Value Ref Range    Cholesterol 158 <200 mg/dL    Triglycerides 87 <150 mg/dL    HDL Cholesterol 44 >39 mg/dL    LDL Cholesterol Calculated 97 <100 mg/dL    Non HDL Cholesterol 114 <130 mg/dL   Basic metabolic panel   Result Value Ref Range    Sodium 141 133 - 144 mmol/L    Potassium 3.9 3.4 - 5.3 mmol/L    Chloride 107 94 - 109 mmol/L    Carbon Dioxide 26 20 - 32 mmol/L    Anion Gap 8 3 - 14 mmol/L    Glucose 93 70 - 99 mg/dL    Urea Nitrogen 19 7 - 30 mg/dL    Creatinine 0.73 0.66 - 1.25 mg/dL    GFR Estimate >90 >60 mL/min/1.7m2    GFR Estimate If Black >90 >60 mL/min/1.7m2    Calcium 8.5 8.5 - 10.1 mg/dL   Hemoglobin A1c   Result Value Ref Range    Hemoglobin A1C 4.9 0 - 5.6 %   CBC with platelets   Result Value Ref Range    WBC 4.3 4.0 - 11.0 10e9/L    RBC Count 4.95 4.4 - 5.9 10e12/L    Hemoglobin 15.7 13.3 - 17.7 g/dL    Hematocrit 44.5 40.0 - 53.0 %    MCV 90 78 - 100 fl    MCH 31.7 26.5 - 33.0 pg    MCHC 35.3 31.5 - 36.5 g/dL    RDW 12.7 10.0 - 15.0 %    Platelet Count 204 150 - 450 10e9/L       If you have any questions please call the clinic at [phone].    Sincerely,    Bill Antunez MD  [facility]

## 2018-07-05 NOTE — PROGRESS NOTES
Lino,  These labs are all nice and normal, including cholesterol values, kidney tests, HIV test, blood counts, and diabetes testing.    Bill Antunez MD

## 2019-01-03 ENCOUNTER — OFFICE VISIT (OUTPATIENT)
Dept: FAMILY MEDICINE | Facility: CLINIC | Age: 39
End: 2019-01-03
Payer: COMMERCIAL

## 2019-01-03 VITALS
BODY MASS INDEX: 30.94 KG/M2 | HEART RATE: 102 BPM | DIASTOLIC BLOOD PRESSURE: 68 MMHG | TEMPERATURE: 99.2 F | WEIGHT: 241 LBS | OXYGEN SATURATION: 96 % | SYSTOLIC BLOOD PRESSURE: 99 MMHG

## 2019-01-03 DIAGNOSIS — R52 BODY ACHES: Primary | ICD-10-CM

## 2019-01-03 DIAGNOSIS — R68.89 FLU-LIKE SYMPTOMS: ICD-10-CM

## 2019-01-03 LAB
FLUAV+FLUBV AG SPEC QL: NEGATIVE
FLUAV+FLUBV AG SPEC QL: NEGATIVE
SPECIMEN SOURCE: NORMAL

## 2019-01-03 PROCEDURE — 87804 INFLUENZA ASSAY W/OPTIC: CPT | Performed by: FAMILY MEDICINE

## 2019-01-03 PROCEDURE — 99213 OFFICE O/P EST LOW 20 MIN: CPT | Performed by: FAMILY MEDICINE

## 2019-01-03 NOTE — PROGRESS NOTES
HPI:    Lino Nava is an 38 year old male who presents for evaluation of:    ENT and Respiratory symptoms - present since 12/25/18. Symptoms are runny nose, cough without shortness of breath, fevers, chills, body aches. No ear pain. No sore throat.   Evaluation and treatment:   Flu test negative today.    I believe has influenza or another similar virus.   I explained this should be self limited and does not require antibiotics.   Over the counter medications can be used for symptom relief.   We discussed symptoms that would warrant repeat clinic visit or ED visit.   I gave him a note for work.    ROS:    Per HPI    Exam:    BP 99/68 (Cuff Size: Adult Large)   Pulse 102   Temp 99.2  F (37.3  C) (Tympanic)   Wt 109.3 kg (241 lb)   SpO2 96%   BMI 30.94 kg/m      Gen: mildly ill appearing male in no acute distress. He prefers to lie down on the exam table.  ENT: TM's normal. Oropharynx normal. Oral mucosa moist without lesions.  Eyes: Conjunctiva and sclera normal. Pupils react normally to light. No nystagmus.  Neck: No enlarged lymph nodes, thyromegally or other masses.  Lungs: Good air movement and otherwise clear.  CV: Heart RRR with no murmurs.     Assessment and Plan - Decision Making    1. Body aches    Per HPI    - Influenza A/B antigen    2. Flu-like symptoms    Per HPI        Written instructions given as follows:    Patient Instructions   The flu test was negative.    You should get better with time.    Take over the counter medications as needed.

## 2019-01-03 NOTE — NURSING NOTE
"Chief Complaint   Patient presents with     URI       Initial BP 99/68 (Cuff Size: Adult Large)   Pulse 102   Temp 99.2  F (37.3  C) (Tympanic)   Wt 109.3 kg (241 lb)   SpO2 96%   BMI 30.94 kg/m   Estimated body mass index is 30.94 kg/m  as calculated from the following:    Height as of 6/29/18: 1.88 m (6' 2\").    Weight as of this encounter: 109.3 kg (241 lb).  Staff and patient were masked during visit.    Francia Li LPN    "

## 2019-01-03 NOTE — PATIENT INSTRUCTIONS
The flu test was negative.    You should get better with time.    Take over the counter medications as needed.

## 2019-01-03 NOTE — LETTER
January 3, 2019      Lino Nava  [address] [facility] [address]      To whom it may concern:    RE: Lino ARAUJO Navajersey Huynh was seen in clinic today due to illness. He will need time off 1-5 days. Please excuse his absence.    Please contact me for questions or concerns.      Sincerely,        LUCRECIA CASTRO MD

## 2019-01-22 ENCOUNTER — OFFICE VISIT (OUTPATIENT)
Dept: FAMILY MEDICINE | Facility: CLINIC | Age: 39
End: 2019-01-22
Payer: COMMERCIAL

## 2019-01-22 VITALS
HEART RATE: 88 BPM | HEIGHT: 74 IN | DIASTOLIC BLOOD PRESSURE: 80 MMHG | SYSTOLIC BLOOD PRESSURE: 119 MMHG | TEMPERATURE: 97.5 F | BODY MASS INDEX: 31.18 KG/M2 | OXYGEN SATURATION: 98 % | WEIGHT: 243 LBS

## 2019-01-22 DIAGNOSIS — J06.9 UPPER RESPIRATORY TRACT INFECTION, UNSPECIFIED TYPE: Primary | ICD-10-CM

## 2019-01-22 PROCEDURE — 99213 OFFICE O/P EST LOW 20 MIN: CPT | Performed by: NURSE PRACTITIONER

## 2019-01-22 RX ORDER — AMOXICILLIN 500 MG/1
500 CAPSULE ORAL 2 TIMES DAILY
Qty: 28 CAPSULE | Refills: 0 | Status: SHIPPED | OUTPATIENT
Start: 2019-01-22 | End: 2019-04-22

## 2019-01-22 RX ORDER — NEOMYCIN SULFATE, POLYMYXIN B SULFATE, HYDROCORTISONE 3.5; 10000; 1 MG/ML; [USP'U]/ML; MG/ML
3 SOLUTION/ DROPS AURICULAR (OTIC) 4 TIMES DAILY
Qty: 5 ML | Refills: 0 | Status: CANCELLED | OUTPATIENT
Start: 2019-01-22 | End: 2019-01-29

## 2019-01-22 RX ORDER — FLUTICASONE PROPIONATE 50 MCG
1-2 SPRAY, SUSPENSION (ML) NASAL DAILY
Qty: 1 BOTTLE | Refills: 1 | Status: SHIPPED | OUTPATIENT
Start: 2019-01-22 | End: 2020-05-04

## 2019-01-22 ASSESSMENT — PAIN SCALES - GENERAL: PAINLEVEL: NO PAIN (0)

## 2019-01-22 ASSESSMENT — MIFFLIN-ST. JEOR: SCORE: 2096.99

## 2019-01-22 ASSESSMENT — PATIENT HEALTH QUESTIONNAIRE - PHQ9: SUM OF ALL RESPONSES TO PHQ QUESTIONS 1-9: 0

## 2019-01-22 NOTE — PROGRESS NOTES
"  SUBJECTIVE:   Lino Nava is a 38 year old male who presents to clinic today for the following health issues:      Acute Illness   Acute illness concerns: Ear issue since christmas  Onset: 12025/18    Fever: no    Chills/Sweats: no    Headache (location?): no    Sinus Pressure:no    Conjunctivitis:  no    Ear Pain: no pain but pressure    Rhinorrhea: yes    Congestion: no    Sore Throat: no     Cough: YES - a little with green mucous    Wheeze: no    Decreased Appetite: no    Nausea: no    Vomiting: no    Diarrhea:  no    Dysuria/Freq.: no    Fatigue/Achiness: YES    Sick/Strep Exposure: family     Therapies Tried and outcome:     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  Additional history: none    Patient Active Problem List   Diagnosis     Obesity (BMI 30.0-34.9)     Mild major depression (H)     KADE (generalized anxiety disorder)     History of leukemia     History reviewed. No pertinent surgical history.    Social History     Tobacco Use     Smoking status: Former Smoker     Smokeless tobacco: Never Used   Substance Use Topics     Alcohol use: Yes     History reviewed. No pertinent family history.        Reviewed and updated as needed this visit by clinical staff  Tobacco  Allergies  Meds  Med Hx  Surg Hx  Fam Hx  Soc Hx      Reviewed and updated as needed this visit by Provider         ROS:  Constitutional, HEENT, cardiovascular, pulmonary, gi and gu systems are negative, except as otherwise noted.    OBJECTIVE:     /80 (BP Location: Right arm, Patient Position: Chair, Cuff Size: Adult Large)   Pulse 88   Temp 97.5  F (36.4  C) (Oral)   Ht 1.88 m (6' 2\")   Wt 110.2 kg (243 " lb)   SpO2 98%   BMI 31.20 kg/m    Body mass index is 31.2 kg/m .  GENERAL: healthy, alert and no distress  HENT: normal cephalic/atraumatic, right ear: erythematous and purulent drainage in canal with dark stringy foreign body appears to be a hair?, left ear: erythematous and retracted, nose and mouth without ulcers or lesions, oropharynx clear and oral mucous membranes moist  NECK: no adenopathy, no asymmetry, masses, or scars and thyroid normal to palpation  RESP: lungs clear to auscultation - no rales, rhonchi or wheezes  CV: regular rate and rhythm, normal S1 S2, no S3 or S4, no murmur, click or rub, no peripheral edema and peripheral pulses strong    Right ear canal lavaged by MA which patient tolerated. Small amount of purulent drainage removed. Afterwards I was able to visualize clear ear canal, TM was erythematous and retracted    Diagnostic Test Results:  none     ASSESSMENT/PLAN:       ICD-10-CM    1. Upper respiratory tract infection, unspecified type J06.9 amoxicillin (AMOXIL) 500 MG capsule     fluticasone (FLONASE) 50 MCG/ACT nasal spray       With symptoms present nearly 1 month, presume bacterial infection and will treat with amoxicillin  No signs of otitis media, or otitis externa. Suspect diminished hearing 2/2 ETD. Can try over the counter decongestant, should improve with Flonase and Abx  If new symptoms, or hearing does not return after 2 months, will refer to ENT    JERI [facility]

## 2019-01-22 NOTE — PATIENT INSTRUCTIONS
Patient Education     Earache, No Infection (Adult)  Earaches can happen without an infection. This occurs when air and fluid build up behind the eardrum causing a feeling of fullness and discomfort and reduced hearing. This is called otitis media with effusion (OME) or serous otitis media. It means there is fluid in the middle ear. It is not the same as acute otitis media, which is typically from infection.  OME can happen when you have a cold if congestion blocks the passage that drains the middle ear. This passage is called the eustachian tube. OME may also occur with nasal allergies or after a bacterial middle ear infection.    The pain or discomfort may come and go. You may hear clicking or popping sounds when you chew or swallow. You may feel that your balance is off. Or you may hear ringing in the ear.  It often takes from several weeks up to 3 months for the fluid to clear on its own. Oral pain relievers and ear drops help if there is pain. Decongestants and antihistamines sometimes help. Antibiotics don't help since there is no infection. Your doctor may prescribe a nasal spray to help reduce swelling in the nose and eustachian tube. This can allow the ear to drain.  If your OME doesn't improve after 3 months, surgery may be used to drain the fluid and insert a small tube in the eardrum to allow continued drainage.  Because the middle ear fluid can become infected, it is important to watch for signs of an ear infection which may develop later. These signs include increased ear pain, fever, or drainage from the ear.  Home care  The following guidelines will help you care for yourself at home:    You may use over-the-counter medicine as directed to control pain, unless another medicine was prescribed. If you have chronic liver or kidney disease or ever had a stomach ulcer or GI bleeding, talk with your doctor before using these medicines. Aspirin should never be used in anyone under 18 years of age who is  ill with a fever. It may cause severe liver damage.    You may use over-the-counter decongestants such as phenylephrine or pseudoephedrine. But they are not always helpful. Don't use nasal spray decongestants more than 3 days. Longer use can make congestion worse. Prescription nasal sprays from your doctor don't typically have those restrictions.    Antihistamines may help if you are also having allergy symptoms.    You may use medicines such as guaifenesin to thin mucus and promote drainage.  Follow-up care  Follow up with your healthcare provider or as advised if you are not feeling better after 3 days.  When to seek medical advice  Call your healthcare provider right away if any of the following occur:    Your ear pain gets worse or does not start to improve     Fever of 100.4 F (38 C) or higher, or as directed by your healthcare provider    Fluid or blood draining from the ear    Headache or sinus pain    Stiff neck    Unusual drowsiness or confusion  Date Last Reviewed: 10/1/2016    1897-4399 [employer]. [address] [place] [address], [place] [address]. All rights reserved. This information is not intended as a substitute for professional medical care. Always follow your healthcare professional's instructions.

## 2019-04-22 ENCOUNTER — OFFICE VISIT (OUTPATIENT)
Dept: FAMILY MEDICINE | Facility: CLINIC | Age: 39
End: 2019-04-22
Payer: COMMERCIAL

## 2019-04-22 VITALS
TEMPERATURE: 97.7 F | HEART RATE: 97 BPM | BODY MASS INDEX: 33 KG/M2 | WEIGHT: 257 LBS | DIASTOLIC BLOOD PRESSURE: 78 MMHG | OXYGEN SATURATION: 96 % | SYSTOLIC BLOOD PRESSURE: 116 MMHG

## 2019-04-22 DIAGNOSIS — H69.91 DYSFUNCTION OF RIGHT EUSTACHIAN TUBE: ICD-10-CM

## 2019-04-22 DIAGNOSIS — F32.0 MILD MAJOR DEPRESSION (H): ICD-10-CM

## 2019-04-22 DIAGNOSIS — F41.1 GAD (GENERALIZED ANXIETY DISORDER): Primary | ICD-10-CM

## 2019-04-22 PROCEDURE — 99214 OFFICE O/P EST MOD 30 MIN: CPT | Performed by: FAMILY MEDICINE

## 2019-04-22 RX ORDER — ESCITALOPRAM OXALATE 10 MG/1
10 TABLET ORAL DAILY
Qty: 30 TABLET | Refills: 5 | Status: SHIPPED | OUTPATIENT
Start: 2019-04-22 | End: 2020-05-04

## 2019-04-22 ASSESSMENT — ANXIETY QUESTIONNAIRES
GAD7 TOTAL SCORE: 3
7. FEELING AFRAID AS IF SOMETHING AWFUL MIGHT HAPPEN: NOT AT ALL
3. WORRYING TOO MUCH ABOUT DIFFERENT THINGS: NOT AT ALL
1. FEELING NERVOUS, ANXIOUS, OR ON EDGE: SEVERAL DAYS
6. BECOMING EASILY ANNOYED OR IRRITABLE: SEVERAL DAYS
5. BEING SO RESTLESS THAT IT IS HARD TO SIT STILL: NOT AT ALL
2. NOT BEING ABLE TO STOP OR CONTROL WORRYING: SEVERAL DAYS

## 2019-04-22 ASSESSMENT — PATIENT HEALTH QUESTIONNAIRE - PHQ9
SUM OF ALL RESPONSES TO PHQ QUESTIONS 1-9: 2
5. POOR APPETITE OR OVEREATING: NOT AT ALL

## 2019-04-22 NOTE — PROGRESS NOTES
"  SUBJECTIVE:   Lino Nava is a 38 year old male who presents to clinic today for the following   health issues:      Depression and Anxiety Follow-Up    Status since last visit: Short temper, more at night    Other associated symptoms:None    Complicating factors:     Significant life event: Son has an eye issue     Current substance abuse: None    PHQ 4/9/2018 6/29/2018 1/22/2019   PHQ-9 Total Score 7 0 0   Q9: Thoughts of better off dead/self-harm past 2 weeks Not at all Not at all Not at all     KADE-7 SCORE 9/23/2016 4/9/2018 6/29/2018   Total Score 4 6 2     PHQ-9  English  PHQ-9   Any Language  KADE-7  Suicide Assessment Five-step Evaluation and Treatment (SAFE-T)    Amount of exercise or physical activity: Work    Problems taking medications regularly: Yes,  Not feeling himself on the sertraline. Would like to try Lexapro.    Medication side effects: Jittery    Diet: regular (no restrictions)      Right ear feels plugged.  He feels somewhat congested.  No ear pain.    He was taking sertraline 50 for quite a number of months for anxiety and depression.  In recent months he would come home from work and feel somewhat jittery but also fairly exhausted.  He was starting to disengage from his family life.  He and his wife have 3 young children age 3, 2-1/2, and 8 months.  He has had a \"short fuse\" with them.  One of their sons has a thin optic nerve eye problem and it's been stressful for them.  He tried stopping his sertraline medicine a month ago to see if that would help him feel any better.  That did not.  If anything, he feels somewhat more anxious and jittery now.  He has talked to friends who have used Lexapro and had good success with that and he is interested in trying it.      Additional history: as documented    Reviewed  and updated as needed this visit by clinical staff  Tobacco  Allergies  Meds  Med Hx  Surg Hx  Fam Hx  Soc Hx        Reviewed and updated as needed this visit by " Provider         Patient Active Problem List   Diagnosis     Obesity (BMI 30.0-34.9)     Mild major depression (H)     KADE (generalized anxiety disorder)     History of leukemia     History reviewed. No pertinent surgical history.    Social History     Tobacco Use     Smoking status: Former Smoker     Smokeless tobacco: Never Used   Substance Use Topics     Alcohol use: Yes     History reviewed. No pertinent family history.      Current Outpatient Medications   Medication Sig Dispense Refill            fluticasone (FLONASE) 50 MCG/ACT nasal spray Spray 1-2 sprays into both nostrils daily (Patient not taking: Reported on 4/22/2019) 1 Bottle 1     No Known Allergies    ROS:  Constitutional, HEENT, cardiovascular, pulmonary, gi and gu systems are negative, except as otherwise noted.    OBJECTIVE:     /78 (BP Location: Right arm, Patient Position: Chair, Cuff Size: Adult Large)   Pulse 97   Temp 97.7  F (36.5  C) (Oral)   Wt 116.6 kg (257 lb)   SpO2 96%   BMI 33.00 kg/m    Body mass index is 33 kg/m .  GENERAL: alert, no distress and over weight  HENT: Both ear canals are clear and the TMs appear without erythema  PSYCH: mentation appears normal, affect normal/bright.  He scored a 2 on his PHQ 9 and a 3 on the KADE 7.    Diagnostic Test Results:  none     ASSESSMENT/PLAN:         ICD-10-CM    1. KADE (generalized anxiety disorder) F41.1 escitalopram (LEXAPRO) 10 MG tablet   2. Mild major depression (H) F32.0 escitalopram (LEXAPRO) 10 MG tablet   3. Dysfunction of right eustachian tube H69.81      He is still having some anxiety, but not much depression  We discussed options for treatment and we will go ahead and try him on Lexapro 10 mg daily in place of the sertraline 50 mg daily  I also recommended good general health habits including healthy eating, regular exercise, adequate sleep, etc.  Discussed possible counseling if the above regimen does not seem to help significantly  The patient was here with his  wife today and they were both in agreement with the above plan  I recommended using his Flonase nasal spray to help relieve the right eustachian tube dysfunction and nasal congestion  Plan a recheck in about 2 months    Bill Antunez MD  [facility]

## 2019-04-23 ASSESSMENT — ANXIETY QUESTIONNAIRES: GAD7 TOTAL SCORE: 3

## 2019-06-20 NOTE — PROGRESS NOTES
"Subjective     Lino Nava is a 38 year old male who presents to clinic today for the following health issues:    [facility]     Mental health Referral    Patient states he had a blotchy yeast rash last year and was given Ketoconazole tablets.  Now he has the same rash and is requesting the same medication.    Medication Followup of Escitalopram    Taking Medication as prescribed: yes    Side Effects:  None    Medication Helping Symptoms:  yes     He has been on generic Lexapro in the last couple of months in place of the sertraline.  He feels like the medicine is working well and he feels like he is doing better than when he was on the sertraline.  He did have an animated discussion with his wife a couple of weeks ago where he was very frustrated and asked the question \"what would be like if I just shot myself and ended it\".  This made his wife very concerned and upset.  She is wanting him to go to a counselor now.  He does have a remote history of substance abuse and she also wants him to go to AA meetings with her father.  The patient denies any substance abuse issues currently.  Previously has used methamphetamines, but went through treatment for that.    Patient Active Problem List   Diagnosis     Obesity (BMI 30.0-34.9)     Mild major depression (H)     KADE (generalized anxiety disorder)     History of leukemia     History reviewed. No pertinent surgical history.    Social History     Tobacco Use     Smoking status: Former Smoker     Smokeless tobacco: Never Used   Substance Use Topics     Alcohol use: Yes     History reviewed. No pertinent family history.      Current Outpatient Medications   Medication Sig Dispense Refill     ketoconazole (NIZORAL) 200 MG tablet Take 1 tablet (200 mg) by mouth daily 7 tablet 1     escitalopram (LEXAPRO) 10 MG tablet Take 1 tablet (10 mg) by mouth daily 30 tablet 5     fluticasone (FLONASE) 50 MCG/ACT nasal spray Spray 1-2 sprays into both nostrils daily (Patient not taking: " "Reported on 4/22/2019) 1 Bottle 1     No Known Allergies      Reviewed and updated as needed this visit by Provider         Review of Systems   ROS COMP: Constitutional, HEENT, cardiovascular, pulmonary, gi and gu systems are negative, except as otherwise noted.      Objective    /80 (BP Location: Left arm, Patient Position: Sitting, Cuff Size: Adult Large)   Pulse 87   Temp 97.9  F (36.6  C) (Oral)   Wt 111.2 kg (245 lb 4 oz)   SpO2 97%   BMI 31.49 kg/m    Body mass index is 31.49 kg/m .  Physical Exam   GENERAL: healthy, alert and no distress  SKIN: He does have a macular salmon-colored blotchy rash on his trunk consistent with tinea versicolor  PSYCH: mentation appears normal, affect normal/bright.  He scored a 0 on both the PHQ 9 and KADE 7 today.    Diagnostic Test Results:  none         Assessment & Plan       ICD-10-CM    1. Mild major depression (H) F32.0 MENTAL HEALTH REFERRAL  - Adult; Outpatient Treatment; Individual/Couples/Family/Group Therapy/Health Psychology; [facility]: [facility] [phone]; We will contact you to schedule the appointment or please call with any questions   2. KADE (generalized anxiety disorder) F41.1 MENTAL HEALTH REFERRAL  - Adult; Outpatient Treatment; Individual/Couples/Family/Group Therapy/Health Psychology; [facility]: [facility] [phone]; We will contact you to schedule the appointment or please call with any questions   3. Tinea versicolor B36.0 ketoconazole (NIZORAL) 200 MG tablet   4. History of substance abuse Z87.898         BMI:   Estimated body mass index is 31.49 kg/m  as calculated from the following:    Height as of 1/22/19: 1.88 m (6' 2\").    Weight as of this encounter: 111.2 kg (245 lb 4 oz).   Weight management plan: Discussed healthy diet and exercise guidelines    His anxiety and depression seem well controlled currently, but I think it would be helpful for him to begin counseling  He was agreeable to that, so a referral " was made for him  We will continue his same Lexapro  I will prescribe ketoconazole to treat the tinea versicolor  He was encouraged to avoid alcohol and other drugs  Plan a recheck with me in about 4 months, or sooner prn      Return in about 4 months (around 10/21/2019) for follow up on depression.    Bill Antunez MD  [facility]    .

## 2019-06-21 ENCOUNTER — OFFICE VISIT (OUTPATIENT)
Dept: FAMILY MEDICINE | Facility: CLINIC | Age: 39
End: 2019-06-21
Payer: COMMERCIAL

## 2019-06-21 VITALS
SYSTOLIC BLOOD PRESSURE: 123 MMHG | WEIGHT: 245.25 LBS | TEMPERATURE: 97.9 F | DIASTOLIC BLOOD PRESSURE: 80 MMHG | OXYGEN SATURATION: 97 % | HEART RATE: 87 BPM | BODY MASS INDEX: 31.49 KG/M2

## 2019-06-21 DIAGNOSIS — B36.0 TINEA VERSICOLOR: ICD-10-CM

## 2019-06-21 DIAGNOSIS — F19.11 HISTORY OF SUBSTANCE ABUSE (H): ICD-10-CM

## 2019-06-21 DIAGNOSIS — F32.0 MILD MAJOR DEPRESSION (H): Primary | ICD-10-CM

## 2019-06-21 DIAGNOSIS — F41.1 GAD (GENERALIZED ANXIETY DISORDER): ICD-10-CM

## 2019-06-21 PROCEDURE — 99214 OFFICE O/P EST MOD 30 MIN: CPT | Performed by: FAMILY MEDICINE

## 2019-06-21 RX ORDER — KETOCONAZOLE 200 MG/1
200 TABLET ORAL DAILY
Qty: 7 TABLET | Refills: 1 | Status: SHIPPED | OUTPATIENT
Start: 2019-06-21 | End: 2020-05-04

## 2019-06-21 ASSESSMENT — ANXIETY QUESTIONNAIRES
2. NOT BEING ABLE TO STOP OR CONTROL WORRYING: NOT AT ALL
3. WORRYING TOO MUCH ABOUT DIFFERENT THINGS: NOT AT ALL
IF YOU CHECKED OFF ANY PROBLEMS ON THIS QUESTIONNAIRE, HOW DIFFICULT HAVE THESE PROBLEMS MADE IT FOR YOU TO DO YOUR WORK, TAKE CARE OF THINGS AT HOME, OR GET ALONG WITH OTHER PEOPLE: NOT DIFFICULT AT ALL
7. FEELING AFRAID AS IF SOMETHING AWFUL MIGHT HAPPEN: NOT AT ALL
5. BEING SO RESTLESS THAT IT IS HARD TO SIT STILL: NOT AT ALL
1. FEELING NERVOUS, ANXIOUS, OR ON EDGE: NOT AT ALL
6. BECOMING EASILY ANNOYED OR IRRITABLE: NOT AT ALL
GAD7 TOTAL SCORE: 0

## 2019-06-21 ASSESSMENT — PATIENT HEALTH QUESTIONNAIRE - PHQ9
5. POOR APPETITE OR OVEREATING: NOT AT ALL
SUM OF ALL RESPONSES TO PHQ QUESTIONS 1-9: 0

## 2019-06-22 ASSESSMENT — ANXIETY QUESTIONNAIRES: GAD7 TOTAL SCORE: 0

## 2019-09-18 ENCOUNTER — TRANSFERRED RECORDS (OUTPATIENT)
Dept: HEALTH INFORMATION MANAGEMENT | Facility: CLINIC | Age: 39
End: 2019-09-18

## 2019-10-15 ENCOUNTER — TRANSFERRED RECORDS (OUTPATIENT)
Dept: HEALTH INFORMATION MANAGEMENT | Facility: CLINIC | Age: 39
End: 2019-10-15

## 2019-10-28 ENCOUNTER — TRANSFERRED RECORDS (OUTPATIENT)
Dept: HEALTH INFORMATION MANAGEMENT | Facility: CLINIC | Age: 39
End: 2019-10-28

## 2019-11-05 ENCOUNTER — TRANSFERRED RECORDS (OUTPATIENT)
Dept: HEALTH INFORMATION MANAGEMENT | Facility: CLINIC | Age: 39
End: 2019-11-05

## 2019-11-06 ENCOUNTER — TRANSFERRED RECORDS (OUTPATIENT)
Dept: HEALTH INFORMATION MANAGEMENT | Facility: CLINIC | Age: 39
End: 2019-11-06

## 2019-12-06 ENCOUNTER — TRANSFERRED RECORDS (OUTPATIENT)
Dept: HEALTH INFORMATION MANAGEMENT | Facility: CLINIC | Age: 39
End: 2019-12-06

## 2020-01-15 ENCOUNTER — TRANSFERRED RECORDS (OUTPATIENT)
Dept: HEALTH INFORMATION MANAGEMENT | Facility: CLINIC | Age: 40
End: 2020-01-15

## 2020-02-11 ENCOUNTER — TELEPHONE (OUTPATIENT)
Dept: FAMILY MEDICINE | Facility: CLINIC | Age: 40
End: 2020-02-11

## 2020-02-11 ENCOUNTER — TRANSFERRED RECORDS (OUTPATIENT)
Dept: HEALTH INFORMATION MANAGEMENT | Facility: CLINIC | Age: 40
End: 2020-02-11

## 2020-02-11 NOTE — TELEPHONE ENCOUNTER
Offered to schedule eye exam. He stated he has it done at [facility] in [place], last one completed about 3 years ago. He declined and said he wanted to continue with dr he has been using. strongly encouraged to make appt soon. He verbalized understanding. Report requested for previous exam.   Reason for Call:  Form, our goal is to have forms completed with 72 hours, however, some forms may require a visit or additional information.    Type of letter, form or note:  medical    Who is the form from?: Patient    Where did the form come from: Patient or family brought in       What clinic location was the form placed at?: [facility])    Where the form was placed: Dr. 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  Please call her when form is completed and ready to      Call taken on 2/11/2020 at 4:36 PM by Jaja Henriquez

## 2020-02-12 NOTE — TELEPHONE ENCOUNTER
Patient is overdue for follow-up with me and I am unable to complete his workability forms.  He is welcome to follow-up with me on this, or perhaps there is another provider that can fill these out for him.

## 2020-02-12 NOTE — TELEPHONE ENCOUNTER
Forms placed in provider's basket for completion.    Routing to review message below from spouse regarding patient's current location.

## 2020-02-12 NOTE — TELEPHONE ENCOUNTER
SHARDA spoke with Karin who states a provider at [facility] is the one that is saying patient cannot work. She is going to  uncompleted form at  and attempt to get that provider to complete the forms. If not, she is aware that patient would need an office visit with PCP to complete.   Forms placed at  for .

## 2020-02-12 NOTE — TELEPHONE ENCOUNTER
There is consent to communicate with spouse Karin. Message left for Karin to return call to TC line to discuss message below.

## 2020-04-22 ENCOUNTER — NURSE TRIAGE (OUTPATIENT)
Dept: FAMILY MEDICINE | Facility: CLINIC | Age: 40
End: 2020-04-22

## 2020-04-22 ENCOUNTER — VIRTUAL VISIT (OUTPATIENT)
Dept: URGENT CARE | Facility: CLINIC | Age: 40
End: 2020-04-22
Payer: COMMERCIAL

## 2020-04-22 DIAGNOSIS — R07.9 ACUTE CHEST PAIN: Primary | ICD-10-CM

## 2020-04-22 PROCEDURE — 99214 OFFICE O/P EST MOD 30 MIN: CPT | Mod: 95 | Performed by: NURSE PRACTITIONER

## 2020-04-22 NOTE — TELEPHONE ENCOUNTER
Reason for call:  Patient reporting a symptom    Symptom or request: Patient states he has been having sharp pains on left side of chest that shoot up towards armpit area for 4 days. No other symptoms.    Duration (how long have symptoms been present): 4 days.    Have you been treated for this before? No    Additional comments: Please call patient back.    Phone Number patient can be reached at:  Home number on file [phone] (home)    Best Time:  Any    Can we leave a detailed message on this number:  YES    Call taken on 4/22/2020 at 6:25 PM by Sarina Cavanaugh

## 2020-04-22 NOTE — TELEPHONE ENCOUNTER
Disposition:  Urgent care visit for sharp L chest/flank pain shooting up to armpit for past 4 days.  No cardiac or lung history. Scheduled Virtual Visit for this evening per current COVID protocol workflow.   Patient instructed on symptoms that would warrant ED visit, such as worsening pain accompanied by other symptoms like SOB, arm/jaw pain, N&V or dizziness.  Understanding voiced.     Additional Information    Negative: Followed an injury to chest    Negative: Severe difficulty breathing (e.g., struggling for each breath, speaks in single words)    Negative: Passed out (i.e., fainted, collapsed and was not responding)    Negative: Chest pain lasting longer than 5 minutes and ANY of the following:* Over 50 years old* Over 30 years old and at least one cardiac risk factor (i.e., high blood pressure, diabetes, high cholesterol, obesity, smoker or strong family history of heart disease)* Pain is crushing, pressure-like, or heavy * Took nitroglycerin and chest pain was not relieved* History of heart disease (i.e., angina, heart attack, bypass surgery, angioplasty, CHF)    Negative: Visible sweat on face or sweat dripping down face    Negative: Sounds like a life-threatening emergency to the triager    Negative: SEVERE chest pain    Negative: Pain also present in shoulder(s) or arm(s) or jaw    Negative: Difficulty breathing    Negative: Cocaine use within last 3 days    Negative: History of prior 'blood clot' in leg or lungs (i.e., deep vein thrombosis, pulmonary embolism)    Negative: Recent illness requiring prolonged bed rest (i.e., immobilization)    Negative: Hip or leg fracture in past 2 months (e.g, or had cast on leg or ankle)    Negative: Major surgery in the past month    Negative: Recent long-distance travel with prolonged time in car, bus, plane, or train (i.e., within past 2 weeks; 6 or more hours duration)    Negative: Heart beating irregularly or very rapidly    Chest pain lasting longer than 5  "minutes    Answer Assessment - Initial Assessment Questions  1. LOCATION: \"Where does it hurt?\"        L side of ribcage/flank, travelling up to [facility].   2. RADIATION: \"Does the pain go anywhere else?\" (e.g., into neck, jaw, arms, back)      See above.  3. ONSET: \"When did the chest pain begin?\" (Minutes, hours or days)      4 days ago  4. PATTERN \"Does the pain come and go, or has it been constant since it started?\"  \"Does it get worse with exertion?\"       Seems to be pretty constant. States he's been out of work on workman's comp for awhile, so hasn't done work or any exercises that wouldn't caused muscle pains.   5. DURATION: \"How long does it last\" (e.g., seconds, minutes, hours)      He can't say, as it is a \"constant, sharp pain\".   6. SEVERITY: \"How bad is the pain?\"  (e.g., Scale 1-10; mild, moderate, or severe)     - MILD (1-3): doesn't interfere with normal activities      - MODERATE (4-7): interferes with normal activities or awakens from sleep     - SEVERE (8-10): excruciating pain, unable to do any normal activities        7/10  7. CARDIAC RISK FACTORS: \"Do you have any history of heart problems or risk factors for heart disease?\" (e.g., prior heart attack, angina; high blood pressure, diabetes, being overweight, high cholesterol, smoking, or strong family history of heart disease)      No  8. PULMONARY RISK FACTORS: \"Do you have any history of lung disease?\"  (e.g., blood clots in lung, asthma, emphysema, birth control pills)      No  9. CAUSE: \"What do you think is causing the chest pain?\"      Unusure  10. OTHER SYMPTOMS: \"Do you have any other symptoms?\" (e.g., dizziness, nausea, vomiting, sweating, fever, difficulty breathing, cough)        He just got over a head cold about a week ago, but no cough.  Denies other symptoms as listed above.   11. PREGNANCY: \"Is there any chance you are pregnant?\" \"When was your last menstrual period?\"        NA    Protocols used: CHEST PAIN-A-[place]    Sindy Jacobsen, " RN

## 2020-04-23 NOTE — PROGRESS NOTES
"The patient has been notified of following:      \"This telephone visit will be conducted via a call between you and your physician/provider. We have found that certain health care needs can be provided without the need for a physical exam.  This service lets us provide the care you need with a short phone conversation.  If a prescription is necessary we can send it directly to your pharmacy.  If lab work is needed we can place an order for that and you can then stop by our lab to have the test done at a later time.     If during the course of the call the physician/provider feels a telephone visit is not appropriate, you will not be charged for this service.\"     SUBJECTIVE:  Lino Nava is a 39 year old male who presents to the office with the CC of chest pain.  He reports it is located left side, started 4 days ago. Hanging on, not worsening. Sharp pains. Pain is 8/10.   Denies cough sore throat fever shortness of breath, arm or jaw pain nausea vomiting dizziness.   No asthma or smoker.   No injury no lumps no rash in that area. No heavy lifting.   No urinary symptoms.   Cardiac risk factors: negative for abnormal lipids, DM, HTN, tobacco and negative FH    Past Medical History:   Diagnosis Date     KADE (generalized anxiety disorder)      History of leukemia     as a child     History of substance abuse     meth -- went thru tx     Mild major depression (H)      Obesity (BMI 30.0-34.9) 10/25/2017         Current Outpatient Medications:      escitalopram (LEXAPRO) 10 MG tablet, Take 1 tablet (10 mg) by mouth daily, Disp: 30 tablet, Rfl: 5     fluticasone (FLONASE) 50 MCG/ACT nasal spray, Spray 1-2 sprays into both nostrils daily (Patient not taking: Reported on 4/22/2019), Disp: 1 Bottle, Rfl: 1     ketoconazole (NIZORAL) 200 MG tablet, Take 1 tablet (200 mg) by mouth daily, Disp: 7 tablet, Rfl: 1    Social History     Tobacco Use     Smoking status: Former Smoker     Smokeless tobacco: Never Used "   Substance Use Topics     Alcohol use: Yes       ROS:CONSTITUTIONAL:NEGATIVE for fever, chills, change in weight  RESP:NEGATIVE for significant cough or SOB  CV: POSITIVE for chest pain/chest pressure  : negative for dysuria, hematuria, decreased urinary stream, erectile dysfunction  NEURO: NEGATIVE for weakness, dizziness or paresthesias    EXAM:  GENERAL APPEARANCE: alert and no distress  RESP: lungs clear to auscultation - no rales, rhonchi or wheezes  CV: regular rates and rhythm, normal S1 S2, no murmur noted  NEURO: Normal strength and tone, sensory exam grossly normal,  normal speech and mentation  SKIN: no suspicious lesions or rashes     Assessment  / IMPRESSION  (R07.9) Acute chest pain  (primary encounter diagnosis)      PLAN: After telephone visit with patient I feel an in person visit would be more appropriate  I advised emergency room for chest pain with unknown cause.   Speaking to him on phone he is stable, does not need an ambulance.   He states he will go now    Telephone time spent 15 minutes    JERI Flanagan CNP

## 2020-04-27 ENCOUNTER — NURSE TRIAGE (OUTPATIENT)
Dept: FAMILY MEDICINE | Facility: CLINIC | Age: 40
End: 2020-04-27

## 2020-04-27 NOTE — TELEPHONE ENCOUNTER
"Red flag call taken; patient reports left side pain for past 4 days, did yardwork yesterday.  States he went to the bank today and on the way home \"all of a sudden\" had severe pain to outer left ribs, he is speaking in short sentences despite telling me he is not short of breath, pain level is severe for past 1.5 hours.   He's been icing and feels it might finally be improving.    Severe chest pain, advised ER evaluation.      Patient verbalized understanding of and agreement with plan.    Esha Evans RN  [facility]        Reason for Disposition    SEVERE chest pain    Additional Information    Negative: Severe difficulty breathing (e.g., struggling for each breath, speaks in single words)    Negative: Passed out (i.e., fainted, collapsed and was not responding)    Negative: Chest pain lasting longer than 5 minutes and ANY of the following:* Over 50 years old* Over 30 years old and at least one cardiac risk factor (i.e., high blood pressure, diabetes, high cholesterol, obesity, smoker or strong family history of heart disease)* Pain is crushing, pressure-like, or heavy * Took nitroglycerin and chest pain was not relieved* History of heart disease (i.e., angina, heart attack, bypass surgery, angioplasty, CHF)    Negative: Visible sweat on face or sweat dripping down face    Negative: Sounds like a life-threatening emergency to the triager    Negative: Followed an injury to chest    Answer Assessment - Initial Assessment Questions  1. LOCATION: \"Where does it hurt?\"        Left side, ribs, not in front of chest  2. RADIATION: \"Does the pain go anywhere else?\" (e.g., into neck, jaw, arms, back)      no  3. ONSET: \"When did the chest pain begin?\" (Minutes, hours or days)       4 days ago  4. PATTERN \"Does the pain come and go, or has it been constant since it started?\"  \"Does it get worse with exertion?\"       Comes and goes, worse today  5. DURATION: \"How long does it last\" (e.g., " "seconds, minutes, hours)      Has lasted 1.5 hours today  6. SEVERITY: \"How bad is the pain?\"  (e.g., Scale 1-10; mild, moderate, or severe)     - MILD (1-3): doesn't interfere with normal activities      - MODERATE (4-7): interferes with normal activities or awakens from sleep     - SEVERE (8-10): excruciating pain, unable to do any normal activities        7  7. CARDIAC RISK FACTORS: \"Do you have any history of heart problems or risk factors for heart disease?\" (e.g., prior heart attack, angina; high blood pressure, diabetes, being overweight, high cholesterol, smoking, or strong family history of heart disease)      No risk factors per patients  8. PULMONARY RISK FACTORS: \"Do you have any history of lung disease?\"  (e.g., blood clots in lung, asthma, emphysema, birth control pills)      no  9. CAUSE: \"What do you think is causing the chest pain?\"      Unknown, yesterday maybe made it worse with yard work yesterday  10. OTHER SYMPTOMS: \"Do you have any other symptoms?\" (e.g., dizziness, nausea, vomiting, sweating, fever, difficulty breathing, cough)        none  11. PREGNANCY: \"Is there any chance you are pregnant?\" \"When was your last menstrual period?\"        n/a    Protocols used: CHEST PAIN-A-[place]      "

## 2020-04-28 ENCOUNTER — PATIENT OUTREACH (OUTPATIENT)
Dept: CARE COORDINATION | Facility: CLINIC | Age: 40
End: 2020-04-28

## 2020-04-28 DIAGNOSIS — M54.9 BACK PAIN: Primary | ICD-10-CM

## 2020-04-28 NOTE — PROGRESS NOTES
Clinic Care Coordination Contact  [facility]/Voicemail    Clinical Data: CHW Outreach  Outreach attempted x 1.  Left message on patient's voicemail with call back information and requested return call.  CHW will try to reach patient again in 1-2 business days. If patient returns my call, please transfer them to Carla HARDEN at [phone].    Reason for Referral: Care Transition: ED to outpatient  Additional pertinent details: Back pain     Notes:  -saw you were in the ER and wanted to see how you're doing now that you're home    -any questions you'd like to ask a nurse?    -CC services free of charge    -follow up recommended with primary physician - can send a message to Dr Antunez if you want for a follow up

## 2020-04-29 NOTE — PROGRESS NOTES
Clinic Care Coordination Contact  [facility]/Voicemail     Clinical Data: Care Coordinator Outreach  Outreach attempted x 2.  Left message on patient's voicemail with call back information and requested return call.  CHW will try to reach patient again in 1-2 business days.    Notes:  -saw you were in the ER and wanted to see how you're doing now that you're home    -any questions you'd like to ask our nurse?    -CC services free of charge    -f/u recommended with PCP, want me to send a message to Dr. Antunez to get in touch with you for a follow up?

## 2020-04-30 NOTE — PROGRESS NOTES
Clinic Care Coordination Contact  [facility]    Clinical Data: Care Coordinator Outreach  Outreach attempted x 3.  Left message on patient's voicemail with call back information and requested return call.  Plan: CHW will send care coordination introduction letter with CHW contact information and explanation of care coordination services via mail. CHW will do no further outreaches at this time.    Notes:  -saw you were in the ER and wanted to see how you're doing now that you're home    -any questions for our nurse?    -f/u recommended with PCP, want me to send a message to Dr. Antunez?    -can call clinic with non-emergent questions

## 2020-05-04 ENCOUNTER — OFFICE VISIT (OUTPATIENT)
Dept: FAMILY MEDICINE | Facility: CLINIC | Age: 40
End: 2020-05-04
Payer: COMMERCIAL

## 2020-05-04 VITALS
BODY MASS INDEX: 34.01 KG/M2 | DIASTOLIC BLOOD PRESSURE: 83 MMHG | WEIGHT: 265 LBS | HEART RATE: 83 BPM | OXYGEN SATURATION: 97 % | SYSTOLIC BLOOD PRESSURE: 124 MMHG | TEMPERATURE: 97.8 F | HEIGHT: 74 IN

## 2020-05-04 DIAGNOSIS — F19.11 HISTORY OF SUBSTANCE ABUSE (H): ICD-10-CM

## 2020-05-04 DIAGNOSIS — M54.6 ACUTE LEFT-SIDED THORACIC BACK PAIN: Primary | ICD-10-CM

## 2020-05-04 DIAGNOSIS — F32.0 MILD MAJOR DEPRESSION (H): ICD-10-CM

## 2020-05-04 DIAGNOSIS — F41.1 GAD (GENERALIZED ANXIETY DISORDER): ICD-10-CM

## 2020-05-04 PROCEDURE — 99214 OFFICE O/P EST MOD 30 MIN: CPT | Performed by: FAMILY MEDICINE

## 2020-05-04 RX ORDER — ESCITALOPRAM OXALATE 10 MG/1
10 TABLET ORAL DAILY
Qty: 30 TABLET | Refills: 5 | Status: SHIPPED | OUTPATIENT
Start: 2020-05-04 | End: 2020-08-26

## 2020-05-04 ASSESSMENT — ANXIETY QUESTIONNAIRES
GAD7 TOTAL SCORE: 0
7. FEELING AFRAID AS IF SOMETHING AWFUL MIGHT HAPPEN: NOT AT ALL
3. WORRYING TOO MUCH ABOUT DIFFERENT THINGS: NOT AT ALL
5. BEING SO RESTLESS THAT IT IS HARD TO SIT STILL: NOT AT ALL
2. NOT BEING ABLE TO STOP OR CONTROL WORRYING: NOT AT ALL
6. BECOMING EASILY ANNOYED OR IRRITABLE: NOT AT ALL
1. FEELING NERVOUS, ANXIOUS, OR ON EDGE: NOT AT ALL

## 2020-05-04 ASSESSMENT — PATIENT HEALTH QUESTIONNAIRE - PHQ9
SUM OF ALL RESPONSES TO PHQ QUESTIONS 1-9: 0
5. POOR APPETITE OR OVEREATING: NOT AT ALL

## 2020-05-04 ASSESSMENT — PAIN SCALES - GENERAL: PAINLEVEL: EXTREME PAIN (8)

## 2020-05-04 ASSESSMENT — MIFFLIN-ST. JEOR: SCORE: 2193.27

## 2020-05-04 NOTE — PROGRESS NOTES
Subjective     Lino Nava is a 39 year old male who presents to clinic today for the following health issues:    HPI   ED/UC Followup:    Facility:  [place]  Date of visit: 4/27/20  Reason for visit: Back pain left side  Current Status: Still has sharp pain. Can't lay on his left side.   Patient states that he was in treatment for meth so he doesn't want any narcotics.     He still has occasional sharp pain on his left mid back region.  Started a week or so ago.  He had done some raking a few days prior to that.  He went to the ER last week and had a CT scan that was negative.  No kidney stones.  His back pain is positional.  It hurts to press on it.  He has been using some naproxen intermittently, but not on a consistent basis.    He is still on Lexapro for depression and anxiety.  He feels like it is working well.  He is taking it daily.  He does have a history of methamphetamine abuse and recently had a mild relapse.  He has completed treatment recently at [facility].  He is not feeling especially depressed or anxious at this time.    Patient Active Problem List   Diagnosis     Obesity (BMI 30.0-34.9)     Mild major depression (H)     KADE (generalized anxiety disorder)     History of leukemia     History of substance abuse (H)     History reviewed. No pertinent surgical history.    Social History     Tobacco Use     Smoking status: Former Smoker     Smokeless tobacco: Never Used   Substance Use Topics     Alcohol use: Yes     History reviewed. No pertinent family history.      Current Outpatient Medications   Medication Sig Dispense Refill     escitalopram (LEXAPRO) 10 MG tablet Take 1 tablet (10 mg) by mouth daily 30 tablet 5     No Known Allergies      Reviewed and updated as needed this visit by Provider         Review of Systems   ROS COMP: Constitutional, HEENT, cardiovascular, pulmonary, gi and gu systems are negative, except as otherwise noted.      Objective    /83 (BP Location: Right arm,  "Patient Position: Sitting, Cuff Size: Adult Large)   Pulse 83   Temp 97.8  F (36.6  C) (Oral)   Ht 1.882 m (6' 2.09\")   Wt 120.2 kg (265 lb)   SpO2 97%   BMI 33.94 kg/m    Body mass index is 33.94 kg/m .  Physical Exam   GENERAL: alert, no distress and over weight  BACK: He has some discomfort to palpation over the left mid back, just above the CVA region, so roughly the mid thoracic region.  No overlying mass or rash.  PSYCH: mentation appears normal, affect normal/bright.  He scored 0 on both his PHQ 9 and KADE 7.    Diagnostic Test Results:    His ER note from last week was reviewed including the CT results        Assessment & Plan       ICD-10-CM    1. Acute left-sided thoracic back pain  M54.6    2. KADE (generalized anxiety disorder)  F41.1 escitalopram (LEXAPRO) 10 MG tablet   3. Mild major depression (H)  F32.0 escitalopram (LEXAPRO) 10 MG tablet   4. History of substance abuse (H)  F19.11         BMI:   Estimated body mass index is 33.94 kg/m  as calculated from the following:    Height as of this encounter: 1.882 m (6' 2.09\").    Weight as of this encounter: 120.2 kg (265 lb).   Weight management plan: Discussed healthy diet and exercise guidelines    He appears to have some residual musculoskeletal back pain, perhaps from overuse from the raking  I recommended relative rest, ice, stretching, and continued use of the NSAID, perhaps taking 2 over-the-counter tablets twice a day with food on a regular basis for a week or 2  We will follow that expectantly    Continue Lexapro for anxiety and depression and continue to avoid methamphetamine and other illegal substances    Return for a recheck if symptoms worsen or fail to improve.    Bill Antunez MD  [facility]      "

## 2020-05-05 ASSESSMENT — ANXIETY QUESTIONNAIRES: GAD7 TOTAL SCORE: 0

## 2020-07-30 ENCOUNTER — PATIENT OUTREACH (OUTPATIENT)
Dept: CARE COORDINATION | Facility: CLINIC | Age: 40
End: 2020-07-30

## 2020-07-30 DIAGNOSIS — F32.A DEPRESSION, UNSPECIFIED DEPRESSION TYPE: Primary | ICD-10-CM

## 2020-07-30 ASSESSMENT — ACTIVITIES OF DAILY LIVING (ADL): DEPENDENT_IADLS:: INDEPENDENT
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
## 2020-07-31 ASSESSMENT — ACTIVITIES OF DAILY LIVING (ADL): DEPENDENT_IADLS:: INDEPENDENT
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
## 2020-08-07 ENCOUNTER — VIRTUAL VISIT (OUTPATIENT)
Dept: FAMILY MEDICINE | Facility: CLINIC | Age: 40
End: 2020-08-07
Payer: COMMERCIAL

## 2020-08-07 DIAGNOSIS — J06.9 UPPER RESPIRATORY TRACT INFECTION, UNSPECIFIED TYPE: Primary | ICD-10-CM

## 2020-08-07 DIAGNOSIS — A09 DIARRHEA OF INFECTIOUS ORIGIN: Primary | ICD-10-CM

## 2020-08-07 PROCEDURE — 99213 OFFICE O/P EST LOW 20 MIN: CPT | Mod: 95 | Performed by: INTERNAL MEDICINE

## 2020-08-07 RX ORDER — LITHIUM CARBONATE 300 MG/1
300 CAPSULE ORAL 2 TIMES DAILY WITH MEALS
Status: ON HOLD | COMMUNITY
End: 2022-10-06

## 2020-08-07 NOTE — PATIENT INSTRUCTIONS
Over the counter symptoms management (tylenol/advil for fever/aches, decongestants. ....  Rest, fluids...)    covid testing    Keep kids from  until your covid status is known (negative)

## 2020-08-07 NOTE — PROGRESS NOTES
"Lino Nava is a 39 year old male who is being evaluated via a billable telephone visit.      The patient has been notified of following:     \"This telephone visit will be conducted via a call between you and your physician/provider. We have found that certain health care needs can be provided without the need for a physical exam.  This service lets us provide the care you need with a short phone conversation.  If a prescription is necessary we can send it directly to your pharmacy.  If lab work is needed we can place an order for that and you can then stop by our lab to have the test done at a later time.    Telephone visits are billed at different rates depending on your insurance coverage. During this emergency period, for some insurers they may be billed the same as an in-person visit.  Please reach out to your insurance provider with any questions.    If during the course of the call the physician/provider feels a telephone visit is not appropriate, you will not be charged for this service.\"    Patient has given verbal consent for Telephone visit?  Yes    What phone number would you like to be contacted at? [phone]  How would you like to obtain your AVS? Mail a copy    Subjective     Lino Nava is a 39 year old male who presents via phone visit today for the following health issues:    40 y/o M made vv for diarrhea for 2 days.  H/o KADE, FABIAN, MDD, currently going to day treatment virtually (going well)    His wife had onset of same sx a week ago, diarrhea only   .    HPI    Sore throat,ears plugged, body aches,congestion x 1 day       Duration: 1 day    Description (location/character/radiation): Sore throat,ears plugged, body aches,congestion x 1 day     Intensity:  mild    Accompanying signs and symptoms:     History (similar episodes/previous evaluation): None    Precipitating or alleviating factors: None    Therapies tried and outcome: None           Patient Active Problem List "   Diagnosis     Obesity (BMI 30.0-34.9)     Mild major depression (H)     KADE (generalized anxiety disorder)     History of leukemia     History of substance abuse (H)     History reviewed. No pertinent surgical history.    Social History     Tobacco Use     Smoking status: Former Smoker     Smokeless tobacco: Never Used   Substance Use Topics     Alcohol use: Not Currently     History reviewed. No pertinent family history.      Current Outpatient Medications   Medication Sig Dispense Refill     lithium 300 MG capsule Take 300 mg by mouth 2 times daily (with meals)       escitalopram (LEXAPRO) 10 MG tablet Take 1 tablet (10 mg) by mouth daily (Patient taking differently: Take 10 mg by mouth daily 1.5 tablets daily) 30 tablet 5     No Known Allergies  Recent Labs   Lab Test 07/03/18  0708   A1C 4.9   LDL 97   HDL 44   TRIG 87   CR 0.73   GFRESTIMATED >90   GFRESTBLACK >90   POTASSIUM 3.9      BP Readings from Last 3 Encounters:   05/04/20 124/83   06/21/19 123/80   04/22/19 116/78    Wt Readings from Last 3 Encounters:   05/04/20 120.2 kg (265 lb)   06/21/19 111.2 kg (245 lb 4 oz)   04/22/19 116.6 kg (257 lb)                    Reviewed and updated as needed this visit by Provider         Review of Systems   , cardiovascular, pulmonary, gi and gu systems are negative, except as otherwise noted.       Objective   Reported vitals:  There were no vitals taken for this visit.   alert, no distress and cooperative  PSYCH: Alert and oriented times 3; coherent speech, normal   rate and volume, able to articulate logical thoughts, able   to abstract reason, no tangential thoughts, no hallucinations   or delusions  His affect is normal and pleasant  RESP: No cough, no audible wheezing, able to talk in full sentences  Remainder of exam unable to be completed due to telephone visits    Diagnostic Test Results:  Labs reviewed in Epic        Assessment/Plan:    1. Upper respiratory tract infection, unspecified type  covid concern.   Wife is ill too with GI symptoms.   Kids go to   - Symptomatic COVID-19 Virus (Coronavirus) by PCR; Future    He will keep kids out of  until covid status is known  If covid (-) and symptoms persist >7d then consider Abx  otc symptoms management in meantime    No follow-ups on file.      Phone call duration:  21 minutes    Brenda Nixon MD

## 2020-08-10 ENCOUNTER — VIRTUAL VISIT (OUTPATIENT)
Dept: FAMILY MEDICINE | Facility: CLINIC | Age: 40
End: 2020-08-10
Payer: COMMERCIAL

## 2020-08-10 DIAGNOSIS — M70.22 OLECRANON BURSITIS OF LEFT ELBOW: Primary | ICD-10-CM

## 2020-08-10 PROCEDURE — 99213 OFFICE O/P EST LOW 20 MIN: CPT | Mod: 95 | Performed by: FAMILY MEDICINE

## 2020-08-10 RX ORDER — DICLOFENAC SODIUM 75 MG/1
75 TABLET, DELAYED RELEASE ORAL 2 TIMES DAILY
Qty: 30 TABLET | Refills: 1 | Status: ON HOLD | OUTPATIENT
Start: 2020-08-10 | End: 2022-10-06

## 2020-08-10 NOTE — PROGRESS NOTES
"Lino Nava is a 39 year old male who is being evaluated via a billable telephone visit.      The patient has been notified of following:     \"This telephone visit will be conducted via a call between you and your physician/provider. We have found that certain health care needs can be provided without the need for a physical exam.  This service lets us provide the care you need with a short phone conversation.  If a prescription is necessary we can send it directly to your pharmacy.  If lab work is needed we can place an order for that and you can then stop by our lab to have the test done at a later time.    Telephone visits are billed at different rates depending on your insurance coverage. During this emergency period, for some insurers they may be billed the same as an in-person visit.  Please reach out to your insurance provider with any questions.    If during the course of the call the physician/provider feels a telephone visit is not appropriate, you will not be charged for this service.\"    Patient has given verbal consent for Telephone visit?  Yes    What phone number would you like to be contacted at? [phone]    How would you like to obtain your AVS? Mail a copy    Subjective     Lino Nava is a 39 year old male who presents via phone visit today for the following health issues:    HPI    Patient was at [facility] for a few days on 7/31/20 for a group therapy and mentioned he was having left elbow pain so x-ray was done which he was informed that he had bursitis and to follow up with PCP if it does not get better.    He denies any known history of trauma to the left elbow or repetitive friction such as resting his left elbow on a desk or table for a prolonged time.  He has had some swelling of the left olecranon region for over a month now.  It is somewhat uncomfortable.  It feels somewhat warm, but is not red.  He has had no fever.  He is on lithium and escitalopram for mental " "health.    Patient Active Problem List   Diagnosis     Obesity (BMI 30.0-34.9)     Mild major depression (H)     KADE (generalized anxiety disorder)     History of leukemia     History of substance abuse (H)     No past surgical history on file.    Social History     Tobacco Use     Smoking status: Former Smoker     Smokeless tobacco: Never Used   Substance Use Topics     Alcohol use: Not Currently     No family history on file.      Current Outpatient Medications   Medication Sig Dispense Refill       1     escitalopram (LEXAPRO) 10 MG tablet Take 1 tablet (10 mg) by mouth daily (Patient taking differently: Take 10 mg by mouth daily 1.5 tablets daily) 30 tablet 5     lithium 300 MG capsule Take 300 mg by mouth 2 times daily (with meals)       No Known Allergies    Reviewed and updated as needed this visit by Provider         Review of Systems   Constitutional, HEENT, cardiovascular, pulmonary, gi and gu systems are negative, except as otherwise noted.       Objective   Reported vitals:  There were no vitals taken for this visit.     This visit is being conducted \"virtually\" via telephone rather than \"in person\" because of the current nationwide COVID-19 pandemic and the desire to limit potential exposures to illness for patients.    PSYCH: Alert and oriented times 3; coherent speech, normal   rate and volume, able to articulate logical thoughts, able   to abstract reason, no tangential thoughts, no hallucinations   or delusions  His affect is normal  RESP: No cough, no audible wheezing, able to talk in full sentences  Remainder of exam unable to be completed due to telephone visits    Diagnostic Test Results:  His x-ray report from 7/26/2020 from [facility] was reviewed in his chart.  It showed moderate soft tissue swelling over the posterior left elbow compatible with olecranon bursitis.        Assessment/Plan:    ICD-10-CM    1. Olecranon bursitis of left elbow  M70.22 diclofenac (VOLTAREN) 75 MG EC tablet "     I reviewed conservative care for olecranon bursitis including relative rest, ice, compression with an Ace wrap, and use an anti-inflammatory medicine for a couple of weeks  Discussed watching for signs of infection such as increasing pain, increasing swelling or warmth or redness, fever, etc.  Discussed possible role for aspiration and injection if symptoms persist, especially since the NSAID could interact with his mental health meds and we will not want to continue that for a prolonged period    Return for a recheck if symptoms worsen or fail to improve.      Phone call duration:  11 minutes    Bill Antunez MD

## 2020-08-26 ENCOUNTER — TELEPHONE (OUTPATIENT)
Dept: FAMILY MEDICINE | Facility: CLINIC | Age: 40
End: 2020-08-26

## 2020-08-26 DIAGNOSIS — F32.0 MILD MAJOR DEPRESSION (H): ICD-10-CM

## 2020-08-26 DIAGNOSIS — F41.1 GAD (GENERALIZED ANXIETY DISORDER): ICD-10-CM

## 2020-08-26 RX ORDER — ESCITALOPRAM OXALATE 5 MG/1
5 TABLET ORAL DAILY
Qty: 30 TABLET | Refills: 5 | Status: ON HOLD | OUTPATIENT
Start: 2020-08-26 | End: 2022-10-06

## 2020-08-26 RX ORDER — ESCITALOPRAM OXALATE 10 MG/1
10 TABLET ORAL DAILY
Qty: 30 TABLET | Refills: 5 | Status: ON HOLD | OUTPATIENT
Start: 2020-08-26 | End: 2022-10-06

## 2020-08-26 NOTE — TELEPHONE ENCOUNTER
Reason for Call:  Medication or medication refill:    Do you use a [facility]?  Name of the pharmacy and phone number for the current request:  [facility], [address], [place] [phone]    Name of the medication requested: escitalopram (LEXAPRO) 10 MG tablet AND 5 MG tablet    Other request: hospital increased patient's dose to take 1 1/2 tabs of the 10mg per day. Raciel calling from [facility] pharmacy states that insurance only covers one tablet per day of the 10 mg. He is wondering if Dr. Antunez would be able to send in separate scripts for the 10 mg and 5 mg     Can we leave a detailed message on this number? YES    Phone number patient can be reached at: Other phone number:  [phone] ([facility])*    Best Time: anytime    Call taken on 8/26/2020 at 12:49 PM by Chin Devlin

## 2020-08-26 NOTE — TELEPHONE ENCOUNTER
Routed to provider. Please see message below. Requested medications cued for provider review.         Thank you.   Rachana Nolasco RN

## 2020-09-10 ENCOUNTER — TRANSFERRED RECORDS (OUTPATIENT)
Dept: HEALTH INFORMATION MANAGEMENT | Facility: CLINIC | Age: 40
End: 2020-09-10

## 2021-08-27 ENCOUNTER — OFFICE VISIT (OUTPATIENT)
Dept: URGENT CARE | Facility: URGENT CARE | Age: 41
End: 2021-08-27
Payer: COMMERCIAL

## 2021-08-27 VITALS
DIASTOLIC BLOOD PRESSURE: 78 MMHG | SYSTOLIC BLOOD PRESSURE: 113 MMHG | OXYGEN SATURATION: 94 % | BODY MASS INDEX: 38.04 KG/M2 | TEMPERATURE: 96.9 F | RESPIRATION RATE: 20 BRPM | HEART RATE: 80 BPM | WEIGHT: 297 LBS

## 2021-08-27 DIAGNOSIS — H66.001 ACUTE SUPPURATIVE OTITIS MEDIA OF RIGHT EAR WITHOUT SPONTANEOUS RUPTURE OF TYMPANIC MEMBRANE, RECURRENCE NOT SPECIFIED: Primary | ICD-10-CM

## 2021-08-27 PROCEDURE — 99213 OFFICE O/P EST LOW 20 MIN: CPT | Performed by: PHYSICIAN ASSISTANT

## 2021-08-27 RX ORDER — AMOXICILLIN 875 MG
875 TABLET ORAL 2 TIMES DAILY
Qty: 20 TABLET | Refills: 0 | Status: SHIPPED | OUTPATIENT
Start: 2021-08-27 | End: 2021-09-06

## 2021-08-27 ASSESSMENT — ENCOUNTER SYMPTOMS
NEUROLOGICAL NEGATIVE: 1
VOMITING: 0
GASTROINTESTINAL NEGATIVE: 1
HEMATURIA: 0
MUSCULOSKELETAL NEGATIVE: 1
ALLERGIC/IMMUNOLOGIC NEGATIVE: 1
FREQUENCY: 0
COUGH: 0
ABDOMINAL PAIN: 0
SORE THROAT: 0
FEVER: 0
RESPIRATORY NEGATIVE: 1
PALPITATIONS: 0
HEADACHES: 0
CHILLS: 0
CARDIOVASCULAR NEGATIVE: 1
CHEST TIGHTNESS: 0
MYALGIAS: 0
NAUSEA: 0
SINUS PRESSURE: 1
CONSTITUTIONAL NEGATIVE: 1
SHORTNESS OF BREATH: 0
WHEEZING: 0
DIARRHEA: 0
DYSURIA: 0

## 2021-08-27 ASSESSMENT — PAIN SCALES - GENERAL: PAINLEVEL: NO PAIN (0)

## 2021-08-27 NOTE — PROGRESS NOTES
Chief Complaint:    Chief Complaint   Patient presents with     Plugged Ears     right ear really blocked          ASSESSMENT    Vital signs reviewed by Nelson Sharif PA-C  /78 (BP Location: Left arm, Patient Position: Sitting, Cuff Size: Adult Large)   Pulse 80   Temp 96.9  F (36.1  C) (Tympanic)   Resp 20   Wt 134.7 kg (297 lb)   SpO2 94%   BMI 38.04 kg/m       1. Acute suppurative otitis media of right ear without spontaneous rupture of tympanic membrane, recurrence not specified         PLAN    Rx for Amoxicillin tonight.  Fluids, vaporizer, acetaminophen, and or ibuprofen for pain.  Follow up with PCP if symptoms are not improving in 1 week. Sooner if symptoms worsen.   Worrisome symptoms discussed with instructions to go to the ED.  Patient verbalized understanding and agreed with this plan.     LABS:    No results found for any visits on 08/27/21.      Respiratory History  occasional episodes of bronchitis    Current Meds    Current Outpatient Medications:      amoxicillin (AMOXIL) 875 MG tablet, Take 1 tablet (875 mg) by mouth 2 times daily for 10 days, Disp: 20 tablet, Rfl: 0     escitalopram (LEXAPRO) 10 MG tablet, Take 1 tablet (10 mg) by mouth daily Take with 5 mg tablet to equal 15 mg total, Disp: 30 tablet, Rfl: 5     escitalopram (LEXAPRO) 5 MG tablet, Take 1 tablet (5 mg) by mouth daily Take with 10 mg tablet to equal 15 mg total., Disp: 30 tablet, Rfl: 5     diclofenac (VOLTAREN) 75 MG EC tablet, Take 1 tablet (75 mg) by mouth 2 times daily with food as needed for left olecranon bursitis (Patient not taking: Reported on 8/27/2021), Disp: 30 tablet, Rfl: 1     lithium 300 MG capsule, Take 300 mg by mouth 2 times daily (with meals) (Patient not taking: Reported on 8/27/2021), Disp: , Rfl:     Problem history  Patient Active Problem List   Diagnosis     Obesity (BMI 30.0-34.9)     Mild major depression (H)     KADE (generalized anxiety disorder)     History of leukemia     History of  substance abuse (H)       Allergies  No Known Allergies      SUBJECTIVE    HPI:Lino Nava is an 40 year old male who presents for possible ear infection. Symptoms include plugged sensation bilaterally, congestion and sinus pressure. Onset 4 days, gradually worsening since that time. Ear history: negative.    Patient is eating and drinking well.  No fever, diarrhea or vomiting.  No cough, or wheezing.    ROS:    Review of Systems   Constitutional: Negative.  Negative for chills and fever.   HENT: Positive for congestion and sinus pressure. Negative for sore throat.    Respiratory: Negative.  Negative for cough, chest tightness, shortness of breath and wheezing.    Cardiovascular: Negative.  Negative for chest pain and palpitations.   Gastrointestinal: Negative.  Negative for abdominal pain, diarrhea, nausea and vomiting.   Genitourinary: Negative for dysuria, frequency, hematuria and urgency.   Musculoskeletal: Negative.  Negative for myalgias.   Skin: Negative for rash.   Allergic/Immunologic: Negative.  Negative for immunocompromised state.   Neurological: Negative.  Negative for headaches.        Family History   History reviewed. No pertinent family history.     Social History  Social History     Socioeconomic History     Marital status: Single     Spouse name: Not on file     Number of children: Not on file     Years of education: Not on file     Highest education level: Not on file   Occupational History     Not on file   Tobacco Use     Smoking status: Former Smoker     Smokeless tobacco: Never Used   Substance and Sexual Activity     Alcohol use: Not Currently     Drug use: No     Sexual activity: Yes     Partners: Female   Other Topics Concern     Parent/sibling w/ CABG, MI or angioplasty before 65F 55M? No   Social History Narrative     Not on file     Social Determinants of Health     Financial Resource Strain:      Difficulty of Paying Living Expenses:    Food Insecurity:      Worried About  Running Out of Food in the Last Year:      Ran Out of Food in the Last Year:    Transportation Needs:      Lack of Transportation (Medical):      Lack of Transportation (Non-Medical):    Physical Activity:      Days of Exercise per Week:      Minutes of Exercise per Session:    Stress:      Feeling of Stress :    Social Connections:      Frequency of Communication with Friends and Family:      Frequency of Social Gatherings with Friends and Family:      Attends [employer] Services:      Active Member of Clubs or Organizations:      Attends Club or Organization Meetings:      Marital Status:    Intimate Partner Violence:      Fear of Current or Ex-Partner:      Emotionally Abused:      Physically Abused:      Sexually Abused:         OBJECTIVE     Physical Exam:     Vital signs reviewed by Nelson Sharif PA-C  /78 (BP Location: Left arm, Patient Position: Sitting, Cuff Size: Adult Large)   Pulse 80   Temp 96.9  F (36.1  C) (Tympanic)   Resp 20   Wt 134.7 kg (297 lb)   SpO2 94%   BMI 38.04 kg/m       Physical Exam:    Physical Exam  Vitals reviewed.   Constitutional:       General: He is not in acute distress.     Appearance: He is well-developed. He is not ill-appearing, toxic-appearing or diaphoretic.   HENT:      Head: Normocephalic and atraumatic.      Right Ear: Hearing, ear canal and external ear normal. No drainage, swelling or tenderness. Tympanic membrane is erythematous and bulging. Tympanic membrane is not perforated or retracted.      Left Ear: Hearing, tympanic membrane, ear canal and external ear normal. No drainage, swelling or tenderness. Tympanic membrane is not perforated, erythematous, retracted or bulging.      Nose: No nasal tenderness, mucosal edema, congestion or rhinorrhea.      Right Turbinates: Not enlarged or swollen.      Left Turbinates: Not enlarged or swollen.      Right Sinus: No maxillary sinus tenderness or frontal sinus tenderness.      Left Sinus: No maxillary sinus  tenderness or frontal sinus tenderness.      Mouth/Throat:      Pharynx: No pharyngeal swelling, oropharyngeal exudate, posterior oropharyngeal erythema or uvula swelling.      Tonsils: No tonsillar exudate. 0 on the right. 0 on the left.   Eyes:      General: Lids are normal.         Right eye: No discharge.         Left eye: No discharge.      Conjunctiva/sclera: Conjunctivae normal.      Right eye: Right conjunctiva is not injected. No exudate.     Left eye: Left conjunctiva is not injected. No exudate.     Pupils: Pupils are equal, round, and reactive to light.   Cardiovascular:      Rate and Rhythm: Normal rate and regular rhythm.      Heart sounds: Normal heart sounds. No murmur heard.   No friction rub. No gallop.    Pulmonary:      Effort: Pulmonary effort is normal. No accessory muscle usage, respiratory distress or retractions.      Breath sounds: Normal breath sounds and air entry. No stridor, decreased air movement or transmitted upper airway sounds. No decreased breath sounds, wheezing, rhonchi or rales.   Chest:      Chest wall: No tenderness.   Abdominal:      General: Bowel sounds are normal. There is no distension.      Palpations: Abdomen is soft. Abdomen is not rigid. There is no mass.      Tenderness: There is no abdominal tenderness. There is no guarding or rebound.   Musculoskeletal:         General: Normal range of motion.      Cervical back: Normal range of motion and neck supple.   Lymphadenopathy:      Head:      Right side of head: No submental, submandibular, tonsillar, preauricular or posterior auricular adenopathy.      Left side of head: No submental, submandibular, tonsillar, preauricular or posterior auricular adenopathy.      Cervical:      Right cervical: No superficial or posterior cervical adenopathy.     Left cervical: No superficial or posterior cervical adenopathy.   Skin:     General: Skin is warm.      Capillary Refill: Capillary refill takes less than 2 seconds.    Neurological:      Mental Status: He is alert and oriented to person, place, and time.      Cranial Nerves: No cranial nerve deficit.      Sensory: No sensory deficit.      Motor: No abnormal muscle tone.      Coordination: Coordination normal.      Deep Tendon Reflexes: Reflexes normal.   Psychiatric:         Behavior: Behavior normal. Behavior is cooperative.         Thought Content: Thought content normal.         Judgment: Judgment normal.            Nelson Sharif PA-C  8/27/2021, 4:50 PM

## 2021-10-19 PROBLEM — F32.9 MAJOR DEPRESSION: Status: ACTIVE | Noted: 2018-06-29

## 2021-12-10 ENCOUNTER — OFFICE VISIT (OUTPATIENT)
Dept: URGENT CARE | Facility: URGENT CARE | Age: 41
End: 2021-12-10
Payer: COMMERCIAL

## 2021-12-10 VITALS
DIASTOLIC BLOOD PRESSURE: 78 MMHG | TEMPERATURE: 96.7 F | WEIGHT: 284.2 LBS | OXYGEN SATURATION: 95 % | SYSTOLIC BLOOD PRESSURE: 115 MMHG | BODY MASS INDEX: 36.4 KG/M2 | HEART RATE: 82 BPM

## 2021-12-10 DIAGNOSIS — H66.001 ACUTE SUPPURATIVE OTITIS MEDIA OF RIGHT EAR WITHOUT SPONTANEOUS RUPTURE OF TYMPANIC MEMBRANE, RECURRENCE NOT SPECIFIED: Primary | ICD-10-CM

## 2021-12-10 PROBLEM — F33.1 MAJOR DEPRESSIVE DISORDER, RECURRENT EPISODE, MODERATE (H): Status: ACTIVE | Noted: 2021-12-10

## 2021-12-10 PROBLEM — F19.90 SUBSTANCE USE DISORDER: Status: ACTIVE | Noted: 2021-12-10

## 2021-12-10 PROCEDURE — 99213 OFFICE O/P EST LOW 20 MIN: CPT | Performed by: PHYSICIAN ASSISTANT

## 2021-12-10 RX ORDER — AMOXICILLIN 875 MG
875 TABLET ORAL 2 TIMES DAILY
Qty: 20 TABLET | Refills: 0 | Status: SHIPPED | OUTPATIENT
Start: 2021-12-10 | End: 2021-12-20

## 2021-12-10 ASSESSMENT — ENCOUNTER SYMPTOMS
CARDIOVASCULAR NEGATIVE: 1
NAUSEA: 0
DIARRHEA: 0
FREQUENCY: 0
FEVER: 0
PALPITATIONS: 0
CHILLS: 0
HEMATURIA: 0
NEUROLOGICAL NEGATIVE: 1
WHEEZING: 0
GASTROINTESTINAL NEGATIVE: 1
CONSTITUTIONAL NEGATIVE: 1
ALLERGIC/IMMUNOLOGIC NEGATIVE: 1
SHORTNESS OF BREATH: 0
MYALGIAS: 0
DYSURIA: 0
COUGH: 0
MUSCULOSKELETAL NEGATIVE: 1
VOMITING: 0
SORE THROAT: 0
HEADACHES: 0
RESPIRATORY NEGATIVE: 1
CHEST TIGHTNESS: 0
ABDOMINAL PAIN: 0

## 2021-12-10 NOTE — PROGRESS NOTES
Chief Complaint:    Chief Complaint   Patient presents with     Ear Problem     right ear blocked x 4-5 days         ASSESSMENT    Vital signs reviewed by Nelson Sharif PA-C  /78 (BP Location: Left arm, Patient Position: Sitting, Cuff Size: Adult Large)   Pulse 82   Temp (!) 96.7  F (35.9  C) (Tympanic)   Wt 128.9 kg (284 lb 3.2 oz)   SpO2 95%   BMI 36.40 kg/m       1. Acute suppurative otitis media of right ear without spontaneous rupture of tympanic membrane, recurrence not specified         PLAN  Rx for Amoxicillin today for ear infecction.  Fluids, vaporizer, acetaminophen, and or ibuprofen for pain.  Follow up with PCP if symptoms are not improving in 1 week. Sooner if symptoms worsen.   Worrisome symptoms discussed with instructions to go to the ED.  Patient verbalized understanding and agreed with this plan.     LABS:    No results found for any visits on 12/10/21.      Respiratory History  occasional episodes of bronchitis    Current Meds    Current Outpatient Medications:      amoxicillin (AMOXIL) 875 MG tablet, Take 1 tablet (875 mg) by mouth 2 times daily for 10 days, Disp: 20 tablet, Rfl: 0     escitalopram (LEXAPRO) 10 MG tablet, Take 1 tablet (10 mg) by mouth daily Take with 5 mg tablet to equal 15 mg total, Disp: 30 tablet, Rfl: 5     escitalopram (LEXAPRO) 5 MG tablet, Take 1 tablet (5 mg) by mouth daily Take with 10 mg tablet to equal 15 mg total., Disp: 30 tablet, Rfl: 5     diclofenac (VOLTAREN) 75 MG EC tablet, Take 1 tablet (75 mg) by mouth 2 times daily with food as needed for left olecranon bursitis (Patient not taking: Reported on 8/27/2021), Disp: 30 tablet, Rfl: 1     lithium 300 MG capsule, Take 300 mg by mouth 2 times daily (with meals) (Patient not taking: Reported on 8/27/2021), Disp: , Rfl:     Problem history  Patient Active Problem List   Diagnosis     Obesity (BMI 30.0-34.9)     Mild major depression (H)     KADE (generalized anxiety disorder)     History of leukemia      History of substance abuse (H)     Chronic bilateral low back pain without sciatica     Major depressive disorder, recurrent episode, moderate (H)     Substance use disorder       Allergies  No Known Allergies      SUBJECTIVE    HPI:Lino Nava is an 41 year old male who presents for possible ear infection. Symptoms include ear pain and plugged sensation on right. Onset 4 days, unchanged since that time. Ear history: few episodes of otitis.    Patient is eating and drinking well.  No fever, diarrhea or vomiting.  No cough, or wheezing.    ROS:    Review of Systems   Constitutional: Negative.  Negative for chills and fever.   HENT: Positive for ear pain. Negative for sore throat.    Respiratory: Negative.  Negative for cough, chest tightness, shortness of breath and wheezing.    Cardiovascular: Negative.  Negative for chest pain and palpitations.   Gastrointestinal: Negative.  Negative for abdominal pain, diarrhea, nausea and vomiting.   Genitourinary: Negative for dysuria, frequency, hematuria and urgency.   Musculoskeletal: Negative.  Negative for myalgias.   Skin: Negative for rash.   Allergic/Immunologic: Negative.  Negative for immunocompromised state.   Neurological: Negative.  Negative for headaches.        Family History   No family history on file.     Social History  Social History     Socioeconomic History     Marital status: Single     Spouse name: Not on file     Number of children: Not on file     Years of education: Not on file     Highest education level: Not on file   Occupational History     Not on file   Tobacco Use     Smoking status: Former Smoker     Smokeless tobacco: Never Used   Substance and Sexual Activity     Alcohol use: Not Currently     Drug use: No     Sexual activity: Yes     Partners: Female   Other Topics Concern     Parent/sibling w/ CABG, MI or angioplasty before 65F 55M? No   Social History Narrative     Not on file     Social Determinants of Health     Financial  Resource Strain: Not on file   Food Insecurity: Not on file   Transportation Needs: Not on file   Physical Activity: Not on file   Stress: Not on file   Social Connections: Not on file   Intimate Partner Violence: Not on file   Housing Stability: Not on file        OBJECTIVE     Physical Exam:     Vital signs reviewed by Nelson Sharif PA-C  /78 (BP Location: Left arm, Patient Position: Sitting, Cuff Size: Adult Large)   Pulse 82   Temp (!) 96.7  F (35.9  C) (Tympanic)   Wt 128.9 kg (284 lb 3.2 oz)   SpO2 95%   BMI 36.40 kg/m       Physical Exam:    Physical Exam  Vitals reviewed.   Constitutional:       General: He is not in acute distress.     Appearance: He is well-developed. He is not ill-appearing, toxic-appearing or diaphoretic.   HENT:      Head: Normocephalic and atraumatic.      Right Ear: Hearing, ear canal and external ear normal. No drainage, swelling or tenderness. Tympanic membrane is erythematous and bulging. Tympanic membrane is not perforated or retracted.      Left Ear: Hearing, tympanic membrane, ear canal and external ear normal. No drainage, swelling or tenderness. Tympanic membrane is not perforated, erythematous, retracted or bulging.      Nose: Congestion present. No nasal tenderness, mucosal edema or rhinorrhea.      Right Turbinates: Not enlarged or swollen.      Left Turbinates: Not enlarged or swollen.      Right Sinus: No maxillary sinus tenderness or frontal sinus tenderness.      Left Sinus: No maxillary sinus tenderness or frontal sinus tenderness.      Mouth/Throat:      Pharynx: No pharyngeal swelling, oropharyngeal exudate, posterior oropharyngeal erythema or uvula swelling.      Tonsils: No tonsillar exudate. 0 on the right. 0 on the left.   Eyes:      General: Lids are normal.         Right eye: No discharge.         Left eye: No discharge.      Conjunctiva/sclera: Conjunctivae normal.      Right eye: Right conjunctiva is not injected. No exudate.     Left eye: Left  conjunctiva is not injected. No exudate.     Pupils: Pupils are equal, round, and reactive to light.   Cardiovascular:      Rate and Rhythm: Normal rate and regular rhythm.      Heart sounds: Normal heart sounds. No murmur heard.  No friction rub. No gallop.    Pulmonary:      Effort: Pulmonary effort is normal. No accessory muscle usage, respiratory distress or retractions.      Breath sounds: Normal breath sounds and air entry. No stridor, decreased air movement or transmitted upper airway sounds. No decreased breath sounds, wheezing, rhonchi or rales.   Chest:      Chest wall: No tenderness.   Abdominal:      General: Bowel sounds are normal. There is no distension.      Palpations: Abdomen is soft. Abdomen is not rigid. There is no mass.      Tenderness: There is no abdominal tenderness. There is no guarding or rebound.   Musculoskeletal:         General: Normal range of motion.      Cervical back: Normal range of motion and neck supple.   Lymphadenopathy:      Head:      Right side of head: No submental, submandibular, tonsillar, preauricular or posterior auricular adenopathy.      Left side of head: No submental, submandibular, tonsillar, preauricular or posterior auricular adenopathy.      Cervical:      Right cervical: No superficial or posterior cervical adenopathy.     Left cervical: No superficial or posterior cervical adenopathy.   Skin:     General: Skin is warm.      Capillary Refill: Capillary refill takes less than 2 seconds.   Neurological:      Mental Status: He is alert and oriented to person, place, and time.      Cranial Nerves: No cranial nerve deficit.      Sensory: No sensory deficit.      Motor: No abnormal muscle tone.      Coordination: Coordination normal.      Deep Tendon Reflexes: Reflexes normal.   Psychiatric:         Behavior: Behavior normal. Behavior is cooperative.         Thought Content: Thought content normal.         Judgment: Judgment normal.            Nelson Sharif PA-C   12/10/2021, 11:07 AM

## 2021-12-10 NOTE — PATIENT INSTRUCTIONS
Patient Education     Middle Ear Infection (Otitis Media) in Adults  What is a middle ear infection?  A middle ear infection occurs behind the eardrum. It is most often caused by a virus or bacteria. Most kids have at least one middle ear infection by the time they are 3 years old. But adults can also get them.   What causes middle ear infections?  Inflammation in the middle ear most often starts after you ve had a sore throat, cold, or other upper respiratory problem. The infection spreads to the middle ear and causes fluid buildup behind the eardrum.    What are the symptoms of a middle ear infection?  These are the most common symptoms of middle ear infections in adults:     Ear pain    Feeling of fullness in the ear    Fluid draining from the ear    Fever    Hearing loss  These symptoms may look like other conditions or health problems. Always talk with your healthcare provider for a diagnosis.   How is a middle ear infection diagnosed?  Your healthcare provider will review your health history and do a physical exam. They will check the outer ear and the eardrum using an otoscope. This is a lighted tool that lets the healthcare provider see inside the ear. A pneumatic otoscope blows a puff of air into the ear to test eardrum movement. When there is fluid or infection in the middle ear, movement is decreased.   Your provider may also do a tympanometry. This is a test that directs air and sound to the middle ear.   If you have ear infections often, your healthcare provider may suggest having a hearing test.   How is a middle ear infection treated?  Treatment will depend on your symptoms, age, and general health. It will also depend on how severe the condition is.   Treatment may include:    Antibiotics    Pain relievers    Placing small tubes in the eardrum for chronic ear infections   What are possible complications of a middle ear infection?   Untreated ear infections can lead to:    Infection in other parts  of the head    Lasting (permanent) hearing loss    Speech and language problems  Can middle ear infections be prevented?  Cold and allergy medicines don't seem to prevent ear infections. And currently there is no vaccine that can prevent the disease. But check with your healthcare provider and make sure your vaccines are up-to-date. Living in a home where cigarettes are smoked can increase the chances of ear infections. So can living in a home where vaping devices, such as e-cigarettes and electronic nicotine, are used   Key points about middle ear infections    Middle ear infections can affect both children and adults.    Pain and fever can be the most common symptoms.    Without treatment, permanent hearing loss may happen.    Take antibiotics as prescribed and finish all of the prescription. This can help prevent antibiotic-resistant infections or incomplete treatment with the infection returning.    Keeping your home smoke-free or free of vaping devices can decrease the chances of ear infections.    Next steps  Tips to help you get the most from a visit to your healthcare provider:    Know the reason for your visit and what you want to happen.    Before your visit, write down questions you want answered.    Bring someone with you to help you ask questions and remember what your provider tells you.    At the visit, write down the name of a new diagnosis, and any new medicines, treatments, or tests. Also write down any new instructions your provider gives you.    Know why a new medicine or treatment is prescribed, and how it will help you. Also know what the side effects are.    Ask if your condition can be treated in other ways.    Know why a test or procedure is recommended and what the results could mean.    Know what to expect if you do not take the medicine or have the test or procedure.    If you have a follow-up appointment, write down the date, time, and purpose for that visit.    Know how you can contact  your provider if you have questions.  Dar last reviewed this educational content on 1/1/2021 2000-2021 [employer]. All rights reserved. This information is not intended as a substitute for professional medical care. Always follow your healthcare professional's instructions.

## 2021-12-27 NOTE — PROGRESS NOTES
Chief Complaint - Hearing loss, snoring    History of Present Illness - Lino Nava is a 41 year old male who presents to me today with hearing loss or a plugged feeling in the right ear. This happens every 3 months, and it can happen in either ear. It has been present and noticeable for approximately a couple years intermittently, but this most recent episode has been 1.5-2 months. No pain. This was NOT preceded by an upper respiratory infection. There is no history of ear surgery. No otorrhea. No allergies. No nasal congestion. Nonsmoker.  The patient has tried amoxicillin, but these things have not helped.     He also snores heavily and wakes up frequently at night. There is some concern for obstructive sleep apnea. This runs in his family. He denies allergies.    Past Medical History -   Patient Active Problem List   Diagnosis     Obesity (BMI 30.0-34.9)     Mild major depression (H)     KADE (generalized anxiety disorder)     History of leukemia     History of substance abuse (H)     Chronic bilateral low back pain without sciatica     Major depressive disorder, recurrent episode, moderate (H)     Substance use disorder       Current Medications -   Current Outpatient Medications:      diclofenac (VOLTAREN) 75 MG EC tablet, Take 1 tablet (75 mg) by mouth 2 times daily with food as needed for left olecranon bursitis (Patient not taking: Reported on 8/27/2021), Disp: 30 tablet, Rfl: 1     escitalopram (LEXAPRO) 10 MG tablet, Take 1 tablet (10 mg) by mouth daily Take with 5 mg tablet to equal 15 mg total, Disp: 30 tablet, Rfl: 5     escitalopram (LEXAPRO) 5 MG tablet, Take 1 tablet (5 mg) by mouth daily Take with 10 mg tablet to equal 15 mg total., Disp: 30 tablet, Rfl: 5     lithium 300 MG capsule, Take 300 mg by mouth 2 times daily (with meals) (Patient not taking: Reported on 8/27/2021), Disp: , Rfl:     Allergies - No Known Allergies    Social History -   Social History     Socioeconomic History      Marital status: Single     Spouse name: Not on file     Number of children: Not on file     Years of education: Not on file     Highest education level: Not on file   Occupational History     Not on file   Tobacco Use     Smoking status: Former Smoker     Smokeless tobacco: Never Used   Substance and Sexual Activity     Alcohol use: Not Currently     Drug use: No     Sexual activity: Yes     Partners: Female   Other Topics Concern     Parent/sibling w/ CABG, MI or angioplasty before 65F 55M? No   Social History Narrative     Not on file     Social Determinants of Health     Financial Resource Strain: Not on file   Food Insecurity: Not on file   Transportation Needs: Not on file   Physical Activity: Not on file   Stress: Not on file   Social Connections: Not on file   Intimate Partner Violence: Not on file   Housing Stability: Not on file       Family History - HOLGER in family    Review of Systems - As per HPI and PMHx, tired a lot, wakes frequently at night, otherwise 7 system review of the head and neck negative.    Physical Exam  /84   Pulse 89   Resp 18   SpO2 96%   General - The patient is nontoxic, in no distress.  Alert and oriented to person and place, answers questions and cooperates with examination appropriately.   Voice and Breathing - The patient was breathing comfortably without the use of accessory muscles. There was no wheezing, stridor, or stertor.  The patients voice was clear and strong.  Ears - clean canals. The tympanic membrane on the right is intact, but appears dull with a middle ear effusion. No acute infection.  No fluid or purulence was seen in the external canal. The tympanic membrane on the left is intact, some middle ear effusion. No acute infection.  No fluid or purulence was seen in the external canal.   Nose - Septum deviated to the right. Turbinates normal size. Airway patent bilaterally. No polyps, masses, or pus.   Eyes - Extraocular movements intact. Sclera were not icteric  or injected.  Mouth - Examination of the oral cavity showed pink, healthy mucosa. No lesions or ulcerations noted.  The tongue was mobile and midline.  Throat - The walls of the oropharynx were smooth, symmetric, and had no lesions or ulcerations.  The uvula was midline on elevation. Tonsils absent.  Neck - Soft, non-tender. Palpation of the occipital, submental, submandibular, internal jugular chain, and supraclavicular nodes did not demonstrate any abnormal lymph nodes or masses. No parotid masses. Palpation of the thyroid was soft and smooth, with no nodules or goiter appreciated.  The trachea was mobile and midline.  Neurological - Cranial nerves 2 through 12 were grossly intact. House-Brackmann grade 1 out of 6 bilaterally.    NASAL ENDOSCOPY -     Anterior rhinoscopy was insufficient to see the eustachian tube orifaces, so I performed flexible nasal endoscopy, and color photographs were taken for the permanent medical record.  I first sprayed the left side of the nasal cavity with a mix of lidocaine and neosynephrine.  I then began on the left side using an adult flexible fiberoptic endoscope. The septum was deviated to the right, the nasal airway was open. The inferior turbinate was normal in size. No abnormal secretions, purulence, or polyps were noted. The right middle turbinate and middle meatus were clearly visualized and normal in appearance. The superior meatus was normal. Going further back, the sphenoethmoid recess was normal in appearance, with healthy appearing mucosa on the face of the sphenoid.  The nasopharynx showed both eustachian tubes orifices were edematous and closed. No masses in the nasopharynx. Adenoidectomy scar present. Some erythema of the nasopharynx and oropharynx.                  Assessment and Plan -     ICD-10-CM    1. OME (otitis media with effusion), bilateral  H65.93 predniSONE (DELTASONE) 20 MG tablet     NASAL ENDOSCOPY, DIAGNOSTIC   2. Dysfunction of both eustachian tubes   H69.83 omeprazole (PRILOSEC) 40 MG DR capsule     predniSONE (DELTASONE) 20 MG tablet     NASAL ENDOSCOPY, DIAGNOSTIC   3. LPRD (laryngopharyngeal reflux disease)  K21.9 omeprazole (PRILOSEC) 40 MG DR capsule   4. Snoring  R06.83 NASAL ENDOSCOPY, DIAGNOSTIC     SLEEP EVALUATION & MANAGEMENT REFERRAL - ADULT -     Lino Nava is a 41 year old male who presents to me today with hearing loss.  This is most consistent with a middle ear effusion in both ears. He denies allergies, is not a smoker, but gets these 3-4 times a year for the last few years. He does have significant amount of edema and erythema of the eustachian tube orifices. I think he may be having silent reflux at night. I recommend omeprazole 40 mg daily, prednisone for the acute effusion today, and Valsalva numerous times a day to pop the ear. If this fails he should return for consideration of myringotomy with ear tube placement. He also has snoring and therefore I recommend sleep medicine referral as he has other signs and symptoms of obstructive sleep apnea.    Yonas Bear MD  Otolaryngology  [facility]

## 2021-12-28 ENCOUNTER — OFFICE VISIT (OUTPATIENT)
Dept: OTOLARYNGOLOGY | Facility: CLINIC | Age: 41
End: 2021-12-28
Payer: COMMERCIAL

## 2021-12-28 VITALS
OXYGEN SATURATION: 96 % | HEART RATE: 89 BPM | RESPIRATION RATE: 18 BRPM | SYSTOLIC BLOOD PRESSURE: 137 MMHG | DIASTOLIC BLOOD PRESSURE: 84 MMHG

## 2021-12-28 DIAGNOSIS — H69.93 DYSFUNCTION OF BOTH EUSTACHIAN TUBES: ICD-10-CM

## 2021-12-28 DIAGNOSIS — H65.93 OME (OTITIS MEDIA WITH EFFUSION), BILATERAL: Primary | ICD-10-CM

## 2021-12-28 DIAGNOSIS — K21.9 LPRD (LARYNGOPHARYNGEAL REFLUX DISEASE): ICD-10-CM

## 2021-12-28 DIAGNOSIS — R06.83 SNORING: ICD-10-CM

## 2021-12-28 PROCEDURE — 31231 NASAL ENDOSCOPY DX: CPT | Performed by: OTOLARYNGOLOGY

## 2021-12-28 PROCEDURE — 99204 OFFICE O/P NEW MOD 45 MIN: CPT | Mod: 25 | Performed by: OTOLARYNGOLOGY

## 2021-12-28 RX ORDER — PREDNISONE 20 MG/1
TABLET ORAL
Qty: 20 TABLET | Refills: 0 | Status: ON HOLD | OUTPATIENT
Start: 2021-12-28 | End: 2022-10-06

## 2021-12-28 RX ORDER — OMEPRAZOLE 40 MG/1
40 CAPSULE, DELAYED RELEASE ORAL DAILY
Qty: 30 CAPSULE | Refills: 3 | Status: ON HOLD | OUTPATIENT
Start: 2021-12-28 | End: 2022-10-06

## 2021-12-28 NOTE — LETTER
12/28/2021         RE: Lino Nava  [address] [facility] [address]        Dear Colleague,    Thank you for referring your patient, Lino Nava, to the [facility]. Please see a copy of my visit note below.    Chief Complaint - Hearing loss, snoring    History of Present Illness - Lino Nava is a 41 year old male who presents to me today with hearing loss or a plugged feeling in the right ear. This happens every 3 months, and it can happen in either ear. It has been present and noticeable for approximately a couple years intermittently, but this most recent episode has been 1.5-2 months. No pain. This was NOT preceded by an upper respiratory infection. There is no history of ear surgery. No otorrhea. No allergies. No nasal congestion. Nonsmoker.  The patient has tried amoxicillin, but these things have not helped.     He also snores heavily and wakes up frequently at night. There is some concern for obstructive sleep apnea. This runs in his family. He denies allergies.    Past Medical History -   Patient Active Problem List   Diagnosis     Obesity (BMI 30.0-34.9)     Mild major depression (H)     KADE (generalized anxiety disorder)     History of leukemia     History of substance abuse (H)     Chronic bilateral low back pain without sciatica     Major depressive disorder, recurrent episode, moderate (H)     Substance use disorder       Current Medications -   Current Outpatient Medications:      diclofenac (VOLTAREN) 75 MG EC tablet, Take 1 tablet (75 mg) by mouth 2 times daily with food as needed for left olecranon bursitis (Patient not taking: Reported on 8/27/2021), Disp: 30 tablet, Rfl: 1     escitalopram (LEXAPRO) 10 MG tablet, Take 1 tablet (10 mg) by mouth daily Take with 5 mg tablet to equal 15 mg total, Disp: 30 tablet, Rfl: 5     escitalopram (LEXAPRO) 5 MG tablet, Take 1 tablet (5 mg) by mouth daily Take with 10 mg tablet to equal 15 mg total., Disp: 30  tablet, Rfl: 5     lithium 300 MG capsule, Take 300 mg by mouth 2 times daily (with meals) (Patient not taking: Reported on 8/27/2021), Disp: , Rfl:     Allergies - No Known Allergies    Social History -   Social History     Socioeconomic History     Marital status: Single     Spouse name: Not on file     Number of children: Not on file     Years of education: Not on file     Highest education level: Not on file   Occupational History     Not on file   Tobacco Use     Smoking status: Former Smoker     Smokeless tobacco: Never Used   Substance and Sexual Activity     Alcohol use: Not Currently     Drug use: No     Sexual activity: Yes     Partners: Female   Other Topics Concern     Parent/sibling w/ CABG, MI or angioplasty before 65F 55M? No   Social History Narrative     Not on file     Social Determinants of Health     Financial Resource Strain: Not on file   Food Insecurity: Not on file   Transportation Needs: Not on file   Physical Activity: Not on file   Stress: Not on file   Social Connections: Not on file   Intimate Partner Violence: Not on file   Housing Stability: Not on file       Family History - HOLGER in family    Review of Systems - As per HPI and PMHx, tired a lot, wakes frequently at night, otherwise 7 system review of the head and neck negative.    Physical Exam  /84   Pulse 89   Resp 18   SpO2 96%   General - The patient is nontoxic, in no distress.  Alert and oriented to person and place, answers questions and cooperates with examination appropriately.   Voice and Breathing - The patient was breathing comfortably without the use of accessory muscles. There was no wheezing, stridor, or stertor.  The patients voice was clear and strong.  Ears - clean canals. The tympanic membrane on the right is intact, but appears dull with a middle ear effusion. No acute infection.  No fluid or purulence was seen in the external canal. The tympanic membrane on the left is intact, some middle ear effusion. No  acute infection.  No fluid or purulence was seen in the external canal.   Nose - Septum deviated to the right. Turbinates normal size. Airway patent bilaterally. No polyps, masses, or pus.   Eyes - Extraocular movements intact. Sclera were not icteric or injected.  Mouth - Examination of the oral cavity showed pink, healthy mucosa. No lesions or ulcerations noted.  The tongue was mobile and midline.  Throat - The walls of the oropharynx were smooth, symmetric, and had no lesions or ulcerations.  The uvula was midline on elevation. Tonsils absent.  Neck - Soft, non-tender. Palpation of the occipital, submental, submandibular, internal jugular chain, and supraclavicular nodes did not demonstrate any abnormal lymph nodes or masses. No parotid masses. Palpation of the thyroid was soft and smooth, with no nodules or goiter appreciated.  The trachea was mobile and midline.  Neurological - Cranial nerves 2 through 12 were grossly intact. House-Brackmann grade 1 out of 6 bilaterally.    NASAL ENDOSCOPY -     Anterior rhinoscopy was insufficient to see the eustachian tube orifaces, so I performed flexible nasal endoscopy, and color photographs were taken for the permanent medical record.  I first sprayed the left side of the nasal cavity with a mix of lidocaine and neosynephrine.  I then began on the left side using an adult flexible fiberoptic endoscope. The septum was deviated to the right, the nasal airway was open. The inferior turbinate was normal in size. No abnormal secretions, purulence, or polyps were noted. The right middle turbinate and middle meatus were clearly visualized and normal in appearance. The superior meatus was normal. Going further back, the sphenoethmoid recess was normal in appearance, with healthy appearing mucosa on the face of the sphenoid.  The nasopharynx showed both eustachian tubes orifices were edematous and closed. No masses in the nasopharynx. Adenoidectomy scar present. Some erythema of  the nasopharynx and oropharynx.                  Assessment and Plan -     ICD-10-CM    1. OME (otitis media with effusion), bilateral  H65.93 predniSONE (DELTASONE) 20 MG tablet     NASAL ENDOSCOPY, DIAGNOSTIC   2. Dysfunction of both eustachian tubes  H69.83 omeprazole (PRILOSEC) 40 MG DR capsule     predniSONE (DELTASONE) 20 MG tablet     NASAL ENDOSCOPY, DIAGNOSTIC   3. LPRD (laryngopharyngeal reflux disease)  K21.9 omeprazole (PRILOSEC) 40 MG DR capsule   4. Snoring  R06.83 NASAL ENDOSCOPY, DIAGNOSTIC     SLEEP EVALUATION & MANAGEMENT REFERRAL - ADULT -     Lino Nava is a 41 year old male who presents to me today with hearing loss.  This is most consistent with a middle ear effusion in both ears. He denies allergies, is not a smoker, but gets these 3-4 times a year for the last few years. He does have significant amount of edema and erythema of the eustachian tube orifices. I think he may be having silent reflux at night. I recommend omeprazole 40 mg daily, prednisone for the acute effusion today, and Valsalva numerous times a day to pop the ear. If this fails he should return for consideration of myringotomy with ear tube placement. He also has snoring and therefore I recommend sleep medicine referral as he has other signs and symptoms of obstructive sleep apnea.    Yonas Bear MD  Otolaryngology  [facility]        Again, thank you for allowing me to participate in the care of your patient.        Sincerely,        Yonas Bear MD

## 2022-01-05 DIAGNOSIS — F41.1 GAD (GENERALIZED ANXIETY DISORDER): ICD-10-CM

## 2022-01-05 DIAGNOSIS — F32.0 MILD MAJOR DEPRESSION (H): ICD-10-CM

## 2022-01-07 RX ORDER — ESCITALOPRAM OXALATE 10 MG/1
TABLET ORAL
Qty: 30 TABLET | Refills: 0 | OUTPATIENT
Start: 2022-01-07

## 2022-03-08 PROBLEM — M70.20 OLECRANON BURSITIS: Status: ACTIVE | Noted: 2020-07-26

## 2022-03-08 PROBLEM — E66.811 OBESITY (BMI 30.0-34.9): Chronic | Status: ACTIVE | Noted: 2017-10-25

## 2022-03-08 PROBLEM — M54.50 CHRONIC BILATERAL LOW BACK PAIN WITHOUT SCIATICA: Status: ACTIVE | Noted: 2020-02-25

## 2022-03-08 PROBLEM — Z85.6 HISTORY OF LEUKEMIA: Chronic | Status: ACTIVE | Noted: 2018-06-29

## 2022-03-08 PROBLEM — F41.1 GAD (GENERALIZED ANXIETY DISORDER): Chronic | Status: ACTIVE | Noted: 2018-06-29

## 2022-03-08 PROBLEM — G89.29 CHRONIC BILATERAL LOW BACK PAIN WITHOUT SCIATICA: Status: ACTIVE | Noted: 2020-02-25

## 2022-03-08 PROBLEM — Z85.6 HISTORY OF LEUKEMIA: Status: ACTIVE | Noted: 2018-06-29

## 2022-03-08 PROBLEM — M70.20 OLECRANON BURSITIS: Status: RESOLVED | Noted: 2020-07-26 | Resolved: 2022-03-08

## 2022-03-08 PROBLEM — F19.11 HISTORY OF SUBSTANCE ABUSE (H): Status: ACTIVE | Noted: 2020-05-04

## 2022-03-08 PROBLEM — F33.1 MAJOR DEPRESSIVE DISORDER, RECURRENT EPISODE, MODERATE (H): Chronic | Status: ACTIVE | Noted: 2021-12-10

## 2022-03-08 PROBLEM — K21.9 LPRD (LARYNGOPHARYNGEAL REFLUX DISEASE): Status: ACTIVE | Noted: 2022-03-08

## 2022-10-04 ENCOUNTER — HOSPITAL ENCOUNTER (EMERGENCY)
Facility: HOSPITAL | Age: 42
Discharge: HOME | End: 2022-10-04
Attending: PHYSICIAN ASSISTANT | Admitting: PHYSICIAN ASSISTANT
Payer: COMMERCIAL

## 2022-10-04 VITALS
HEART RATE: 96 BPM | OXYGEN SATURATION: 97 % | SYSTOLIC BLOOD PRESSURE: 118 MMHG | TEMPERATURE: 98.2 F | DIASTOLIC BLOOD PRESSURE: 70 MMHG | RESPIRATION RATE: 18 BRPM

## 2022-10-04 DIAGNOSIS — F15.10 METHAMPHETAMINE ABUSE, EPISODIC (H): ICD-10-CM

## 2022-10-04 LAB
ALBUMIN UR-MCNC: NEGATIVE MG/DL
AMPHETAMINES UR QL: DETECTED
APPEARANCE UR: CLEAR
BARBITURATES UR QL SCN: NOT DETECTED
BENZODIAZ UR QL SCN: NOT DETECTED
BILIRUB UR QL STRIP: NEGATIVE
BUPRENORPHINE UR QL: NOT DETECTED
CANNABINOIDS UR QL: DETECTED
COCAINE UR QL SCN: NOT DETECTED
COLOR UR AUTO: ABNORMAL
D-METHAMPHET UR QL: DETECTED
GLUCOSE UR STRIP-MCNC: NEGATIVE MG/DL
HGB UR QL STRIP: NEGATIVE
HYALINE CASTS: 6 /LPF
KETONES UR STRIP-MCNC: NEGATIVE MG/DL
LEUKOCYTE ESTERASE UR QL STRIP: NEGATIVE
METHADONE UR QL SCN: NOT DETECTED
MUCOUS THREADS #/AREA URNS LPF: PRESENT /LPF
NITRATE UR QL: NEGATIVE
OPIATES UR QL SCN: NOT DETECTED
OXYCODONE UR QL SCN: NOT DETECTED
PCP UR QL SCN: NOT DETECTED
PH UR STRIP: 5.5 [PH] (ref 4.7–8)
PROPOXYPH UR QL: NOT DETECTED
RBC URINE: 0 /HPF
SP GR UR STRIP: 1.01 (ref 1–1.03)
SQUAMOUS EPITHELIAL: 0 /HPF
TRICYCLICS UR QL SCN: NOT DETECTED
UROBILINOGEN UR STRIP-MCNC: NORMAL MG/DL
WBC URINE: <1 /HPF

## 2022-10-04 PROCEDURE — 250N000011 HC RX IP 250 OP 636: Performed by: PHYSICIAN ASSISTANT

## 2022-10-04 PROCEDURE — 96372 THER/PROPH/DIAG INJ SC/IM: CPT | Performed by: PHYSICIAN ASSISTANT

## 2022-10-04 PROCEDURE — 250N000013 HC RX MED GY IP 250 OP 250 PS 637: Performed by: PHYSICIAN ASSISTANT

## 2022-10-04 PROCEDURE — 80306 DRUG TEST PRSMV INSTRMNT: CPT | Performed by: PHYSICIAN ASSISTANT

## 2022-10-04 PROCEDURE — 81001 URINALYSIS AUTO W/SCOPE: CPT | Performed by: PHYSICIAN ASSISTANT

## 2022-10-04 PROCEDURE — 99283 EMERGENCY DEPT VISIT LOW MDM: CPT | Performed by: PHYSICIAN ASSISTANT

## 2022-10-04 PROCEDURE — 99285 EMERGENCY DEPT VISIT HI MDM: CPT

## 2022-10-04 RX ORDER — DIAZEPAM 5 MG
10 TABLET ORAL ONCE
Status: COMPLETED | OUTPATIENT
Start: 2022-10-04 | End: 2022-10-04

## 2022-10-04 RX ORDER — DIAZEPAM 10 MG/2ML
10 INJECTION, SOLUTION INTRAMUSCULAR; INTRAVENOUS ONCE
Status: COMPLETED | OUTPATIENT
Start: 2022-10-04 | End: 2022-10-04

## 2022-10-04 RX ADMIN — DIAZEPAM 10 MG: 5 TABLET ORAL at 17:42

## 2022-10-04 RX ADMIN — DIAZEPAM 10 MG: 5 INJECTION, SOLUTION INTRAMUSCULAR; INTRAVENOUS at 17:15

## 2022-10-04 ASSESSMENT — ACTIVITIES OF DAILY LIVING (ADL)
ADLS_ACUITY_SCORE: 35
ADLS_ACUITY_SCORE: 35

## 2022-10-04 ASSESSMENT — ENCOUNTER SYMPTOMS
FEVER: 0
SHORTNESS OF BREATH: 0

## 2022-10-04 NOTE — ED PROVIDER NOTES
History     Chief Complaint   Patient presents with     Psychiatric Evaluation     The history is provided by the patient and the EMS personnel.     Lino Nava is a 41 year old male who presented to the emergency department ambulatory along with EMS for evaluation of depression and severe anxiety.  The patient endorses substance abuse including marijuana and methamphetamine.  Denies suicidal or homicidal ideation.    Allergies:  No Known Allergies    Problem List:    Patient Active Problem List    Diagnosis Date Noted     Major depressive disorder, recurrent episode, moderate (H) 12/10/2021     Priority: Medium     History of substance abuse (H) 05/04/2020     Priority: Medium     Methamphetamine       Chronic bilateral low back pain without sciatica 02/25/2020     Priority: Medium     KADE (generalized anxiety disorder) 06/29/2018     Priority: Medium     History of leukemia 06/29/2018     Priority: Medium     as a child       Obesity (BMI 30.0-34.9) 10/25/2017     Priority: Medium     LPRD (laryngopharyngeal reflux disease) 03/08/2022     Priority: Low        Past Medical History:    Past Medical History:   Diagnosis Date     KADE (generalized anxiety disorder)      History of leukemia      History of substance abuse (H)      Mild major depression (H)      Obesity (BMI 30.0-34.9)      Olecranon bursitis 07/26/2020       Past Surgical History:    No past surgical history on file.    Family History:    No family history on file.    Social History:  Marital Status:  Single [1]  Social History     Tobacco Use     Smoking status: Former Smoker     Smokeless tobacco: Never Used   Substance Use Topics     Alcohol use: Not Currently     Drug use: No        Medications:    escitalopram (LEXAPRO) 10 MG tablet  escitalopram (LEXAPRO) 5 MG tablet  predniSONE (DELTASONE) 20 MG tablet  diclofenac (VOLTAREN) 75 MG EC tablet  lithium 300 MG capsule  omeprazole (PRILOSEC) 40 MG DR capsule          Review of Systems    Constitutional: Negative for fever.   Respiratory: Negative for shortness of breath.    Psychiatric/Behavioral:        See HPI       Physical Exam   BP: 110/79  Pulse: 110  Resp: 16  SpO2: 97 %      Physical Exam  Vitals and nursing note reviewed.   Constitutional:       General: He is not in acute distress.     Appearance: Normal appearance. He is not ill-appearing, toxic-appearing or diaphoretic.   Cardiovascular:      Rate and Rhythm: Regular rhythm.      Comments: Tachycardia  Pulmonary:      Effort: Pulmonary effort is normal.   Skin:     General: Skin is warm and dry.      Capillary Refill: Capillary refill takes less than 2 seconds.   Neurological:      General: No focal deficit present.      Mental Status: He is alert and oriented to person, place, and time.   Psychiatric:      Comments: Moving around continuously and making multiple statements of how embarrassed he is from using methamphetamines.  Hyperactive without evidence of overt psychosis.         ED Course                 Procedures              Critical Care time:  none               Results for orders placed or performed during the hospital encounter of 10/04/22 (from the past 24 hour(s))   UA with Microscopic reflex to Culture    Specimen: Urine, NOS   Result Value Ref Range    Color Urine Light Yellow Colorless, Straw, Light Yellow, Yellow    Appearance Urine Clear Clear    Glucose Urine Negative Negative mg/dL    Bilirubin Urine Negative Negative    Ketones Urine Negative Negative mg/dL    Specific Gravity Urine 1.011 1.003 - 1.035    Blood Urine Negative Negative    pH Urine 5.5 4.7 - 8.0    Protein Albumin Urine Negative Negative mg/dL    Urobilinogen Urine Normal Normal, 2.0 mg/dL    Nitrite Urine Negative Negative    Leukocyte Esterase Urine Negative Negative    Mucus Urine Present (A) None Seen /LPF    RBC Urine 0 <=2 /HPF    WBC Urine <1 <=5 /HPF    Squamous Epithelials Urine 0 <=1 /HPF    Hyaline Casts Urine 6 (H) <=2 /LPF    Narrative     Urine Culture not indicated   Urine Drugs of Abuse Screen    Narrative    The following orders were created for panel order Urine Drugs of Abuse Screen.  Procedure                               Abnormality         Status                     ---------                               -----------         ------                     Urine Drugs of Abuse Scr...[[id number]]  Abnormal            Final result                 Please view results for these tests on the individual orders.   Urine Drugs of Abuse Screen Panel 13   Result Value Ref Range    Cannabinoids (20-oto-6-carboxy-9-THC) Detected (A) Not Detected, Indeterminate    Phencyclidine Not Detected Not Detected, Indeterminate    Cocaine (Benzoylecgonine) Not Detected Not Detected, Indeterminate    Methamphetamine (d-Methamphetamine) Detected (A) Not Detected, Indeterminate    Opiates (Morphine) Not Detected Not Detected, Indeterminate    Amphetamine (d-Amphetamine) Detected (A) Not Detected, Indeterminate    Benzodiazepines (Nordiazepam) Not Detected Not Detected, Indeterminate    Tricyclic Antidepressants (Desipramine) Not Detected Not Detected, Indeterminate    Methadone Not Detected Not Detected, Indeterminate    Barbiturates (Butalbital) Not Detected Not Detected, Indeterminate    Oxycodone Not Detected Not Detected, Indeterminate    Propoxyphene (Norpropoxyphene) Not Detected Not Detected, Indeterminate    Buprenorphine Not Detected Not Detected, Indeterminate       Medications   diazepam (VALIUM) injection 10 mg (10 mg Intramuscular Given 10/4/22 1715)   diazepam (VALIUM) tablet 10 mg (10 mg Oral Given 10/4/22 1742)       Assessments & Plan (with Medical Decision Making)   Patient presented for significant anxiety after using methamphetamines.  He received a total of 20 mg of Valium in the emergency department.  He was observed and was able to eat and drink.  He was ambulatory to the bathroom.  He has a safe place for discharge.  His brother is willing to  bring him home.  This is certainly the most reasonable disposition at this time.    This document was prepared using a combination of typing and voice generated software.  While every attempt was made for accuracy, spelling and grammatical errors may exist.    I have reviewed the nursing notes.    I have reviewed the findings, diagnosis, plan and need for follow up with the patient.       New Prescriptions    No medications on file       Final diagnoses:   Methamphetamine abuse, episodic (H)       10/4/2022   [facility]     Sindi Osuna PA-C  10/04/22 1931

## 2022-10-04 NOTE — ED TRIAGE NOTES
"Patient arrived via EMS. EMS advised that patient was here for a psychiatric evaluation. Patient is throwing arms and legs around. Patient reports that he feels stupid. He states he has been upset and is going through a divorce. Patient stated he had smoked \"weed.\" patient was asked if he had used any other illicit drugs. Patient stated, \"There may have been something else.\" Patient was asked specifically other than the marijuana what else did he use. Patient reports he used Methamphetamines. Patient denies being able to state when he had used. Unable to obtain vitals initial due to patient unable to sit still.       "

## 2022-10-04 NOTE — ED NOTES
Patient is yelling in the room swearing and was approached. Patient stated he is just embarrassed for using meth and that he is yelling at himself. Patient was advised he would need to be more quiet as there other other patients in the emergency room. Patient verbalized understanding.

## 2022-10-05 ENCOUNTER — APPOINTMENT (OUTPATIENT)
Dept: CT IMAGING | Facility: HOSPITAL | Age: 42
DRG: 208 | End: 2022-10-05
Attending: STUDENT IN AN ORGANIZED HEALTH CARE EDUCATION/TRAINING PROGRAM
Payer: COMMERCIAL

## 2022-10-05 ENCOUNTER — HOSPITAL ENCOUNTER (INPATIENT)
Facility: HOSPITAL | Age: 42
LOS: 3 days | Discharge: HOME | DRG: 208 | End: 2022-10-08
Attending: STUDENT IN AN ORGANIZED HEALTH CARE EDUCATION/TRAINING PROGRAM | Admitting: INTERNAL MEDICINE
Payer: COMMERCIAL

## 2022-10-05 ENCOUNTER — APPOINTMENT (OUTPATIENT)
Dept: GENERAL RADIOLOGY | Facility: HOSPITAL | Age: 42
DRG: 208 | End: 2022-10-05
Attending: STUDENT IN AN ORGANIZED HEALTH CARE EDUCATION/TRAINING PROGRAM
Payer: COMMERCIAL

## 2022-10-05 ENCOUNTER — APPOINTMENT (OUTPATIENT)
Dept: GENERAL RADIOLOGY | Facility: HOSPITAL | Age: 42
DRG: 208 | End: 2022-10-05
Attending: INTERNAL MEDICINE
Payer: COMMERCIAL

## 2022-10-05 DIAGNOSIS — J96.01 ACUTE RESPIRATORY FAILURE WITH HYPOXIA (H): ICD-10-CM

## 2022-10-05 DIAGNOSIS — J69.0 ASPIRATION PNEUMONITIS (H): ICD-10-CM

## 2022-10-05 PROBLEM — F15.10 AMPHETAMINE ABUSE (H): Status: ACTIVE | Noted: 2022-10-05

## 2022-10-05 PROBLEM — R04.0 EPISTAXIS: Status: ACTIVE | Noted: 2022-10-05

## 2022-10-05 PROBLEM — T79.6XXA TRAUMATIC RHABDOMYOLYSIS (H): Status: ACTIVE | Noted: 2022-10-05

## 2022-10-05 LAB
ABO/RH(D): NORMAL
ALBUMIN SERPL-MCNC: 3.4 G/DL (ref 3.4–5)
ALBUMIN UR-MCNC: 30 MG/DL
ALP SERPL-CCNC: 80 U/L (ref 40–150)
ALT SERPL W P-5'-P-CCNC: 27 U/L (ref 0–70)
AMMONIA PLAS-SCNC: <10 UMOL/L (ref 10–50)
AMPHETAMINES UR QL: DETECTED
ANION GAP SERPL CALCULATED.3IONS-SCNC: 5 MMOL/L (ref 3–14)
ANTIBODY SCREEN: NEGATIVE
APAP SERPL-MCNC: <2 MG/L (ref 10–30)
APPEARANCE UR: CLEAR
AST SERPL W P-5'-P-CCNC: 23 U/L (ref 0–45)
BARBITURATES UR QL SCN: NOT DETECTED
BASE EXCESS BLDA CALC-SCNC: -1.8 MMOL/L (ref -9–1.8)
BASE EXCESS BLDA CALC-SCNC: -2.3 MMOL/L (ref -9–1.8)
BASOPHILS # BLD AUTO: 0 10E3/UL (ref 0–0.2)
BASOPHILS NFR BLD AUTO: 0 %
BENZODIAZ UR QL SCN: DETECTED
BILIRUB SERPL-MCNC: 0.5 MG/DL (ref 0.2–1.3)
BILIRUB UR QL STRIP: NEGATIVE
BUN SERPL-MCNC: 29 MG/DL (ref 7–30)
BUPRENORPHINE UR QL: DETECTED
CALCIUM SERPL-MCNC: 8.4 MG/DL (ref 8.5–10.1)
CANNABINOIDS UR QL: DETECTED
CHLORIDE BLD-SCNC: 108 MMOL/L (ref 94–109)
CK SERPL-CCNC: 676 U/L (ref 30–300)
CO2 SERPL-SCNC: 28 MMOL/L (ref 20–32)
COCAINE UR QL SCN: NOT DETECTED
COLOR UR AUTO: YELLOW
CREAT SERPL-MCNC: 0.86 MG/DL (ref 0.66–1.25)
D-METHAMPHET UR QL: DETECTED
EOSINOPHIL # BLD AUTO: 0 10E3/UL (ref 0–0.7)
EOSINOPHIL NFR BLD AUTO: 0 %
ERYTHROCYTE [DISTWIDTH] IN BLOOD BY AUTOMATED COUNT: 14 % (ref 10–15)
ETHANOL SERPL-MCNC: <0.01 G/DL
GFR SERPL CREATININE-BSD FRML MDRD: >90 ML/MIN/1.73M2
GLUCOSE BLD-MCNC: 103 MG/DL (ref 70–99)
GLUCOSE BLDC GLUCOMTR-MCNC: 98 MG/DL (ref 70–99)
GLUCOSE UR STRIP-MCNC: NEGATIVE MG/DL
HCO3 BLD-SCNC: 25 MMOL/L (ref 21–28)
HCO3 BLD-SCNC: 25 MMOL/L (ref 21–28)
HCT VFR BLD AUTO: 47.7 % (ref 40–53)
HGB BLD-MCNC: 15.8 G/DL (ref 13.3–17.7)
HGB UR QL STRIP: NEGATIVE
HOLD SPECIMEN: NORMAL
HYALINE CASTS: 24 /LPF
IMM GRANULOCYTES # BLD: 0 10E3/UL
IMM GRANULOCYTES NFR BLD: 0 %
INR PPP: 0.95 (ref 0.85–1.15)
KETONES UR STRIP-MCNC: NEGATIVE MG/DL
LACTATE SERPL-SCNC: 2 MMOL/L (ref 0.7–2)
LACTATE SERPL-SCNC: 2.1 MMOL/L (ref 0.7–2)
LEUKOCYTE ESTERASE UR QL STRIP: NEGATIVE
LITHIUM SERPL-SCNC: <0.2 MMOL/L
LYMPHOCYTES # BLD AUTO: 0.2 10E3/UL (ref 0.8–5.3)
LYMPHOCYTES NFR BLD AUTO: 6 %
MCH RBC QN AUTO: 29.7 PG (ref 26.5–33)
MCHC RBC AUTO-ENTMCNC: 33.1 G/DL (ref 31.5–36.5)
MCV RBC AUTO: 90 FL (ref 78–100)
METHADONE UR QL SCN: NOT DETECTED
MONOCYTES # BLD AUTO: 0.2 10E3/UL (ref 0–1.3)
MONOCYTES NFR BLD AUTO: 6 %
MUCOUS THREADS #/AREA URNS LPF: PRESENT /LPF
NEUTROPHILS # BLD AUTO: 2.4 10E3/UL (ref 1.6–8.3)
NEUTROPHILS NFR BLD AUTO: 88 %
NITRATE UR QL: NEGATIVE
NRBC # BLD AUTO: 0 10E3/UL
NRBC BLD AUTO-RTO: 0 /100
NT-PROBNP SERPL-MCNC: 220 PG/ML (ref 0–450)
O2/TOTAL GAS SETTING VFR VENT: 90 %
O2/TOTAL GAS SETTING VFR VENT: 90 %
OPIATES UR QL SCN: NOT DETECTED
OXYCODONE UR QL SCN: NOT DETECTED
OXYHGB MFR BLD: 100 % (ref 92–100)
PCO2 BLD: 49 MM HG (ref 35–45)
PCO2 BLD: 53 MM HG (ref 35–45)
PCP UR QL SCN: NOT DETECTED
PH BLD: 7.29 [PH] (ref 7.35–7.45)
PH BLD: 7.32 [PH] (ref 7.35–7.45)
PH UR STRIP: 5.5 [PH] (ref 4.7–8)
PLATELET # BLD AUTO: 233 10E3/UL (ref 150–450)
PO2 BLD: 237 MM HG (ref 80–105)
PO2 BLD: 69 MM HG (ref 80–105)
POTASSIUM BLD-SCNC: 4.2 MMOL/L (ref 3.4–5.3)
PROPOXYPH UR QL: NOT DETECTED
PROT SERPL-MCNC: 7 G/DL (ref 6.8–8.8)
RBC # BLD AUTO: 5.32 10E6/UL (ref 4.4–5.9)
RBC URINE: 0 /HPF
SALICYLATES SERPL-MCNC: <2 MG/DL
SARS-COV-2 RNA RESP QL NAA+PROBE: NEGATIVE
SODIUM SERPL-SCNC: 141 MMOL/L (ref 133–144)
SP GR UR STRIP: 1.03 (ref 1–1.03)
SPECIMEN EXPIRATION DATE: NORMAL
SQUAMOUS EPITHELIAL: 0 /HPF
TRICYCLICS UR QL SCN: NOT DETECTED
TROPONIN I SERPL HS-MCNC: 27 NG/L
TROPONIN I SERPL HS-MCNC: 27 NG/L
TSH SERPL DL<=0.005 MIU/L-ACNC: 0.98 MU/L (ref 0.4–4)
UROBILINOGEN UR STRIP-MCNC: NORMAL MG/DL
WBC # BLD AUTO: 2.7 10E3/UL (ref 4–11)
WBC URINE: 2 /HPF

## 2022-10-05 PROCEDURE — 82803 BLOOD GASES ANY COMBINATION: CPT | Performed by: STUDENT IN AN ORGANIZED HEALTH CARE EDUCATION/TRAINING PROGRAM

## 2022-10-05 PROCEDURE — 83605 ASSAY OF LACTIC ACID: CPT | Performed by: INTERNAL MEDICINE

## 2022-10-05 PROCEDURE — 82040 ASSAY OF SERUM ALBUMIN: CPT | Performed by: STUDENT IN AN ORGANIZED HEALTH CARE EDUCATION/TRAINING PROGRAM

## 2022-10-05 PROCEDURE — 80178 ASSAY OF LITHIUM: CPT | Performed by: INTERNAL MEDICINE

## 2022-10-05 PROCEDURE — 84443 ASSAY THYROID STIM HORMONE: CPT | Performed by: STUDENT IN AN ORGANIZED HEALTH CARE EDUCATION/TRAINING PROGRAM

## 2022-10-05 PROCEDURE — 72125 CT NECK SPINE W/O DYE: CPT

## 2022-10-05 PROCEDURE — 258N000003 HC RX IP 258 OP 636: Performed by: STUDENT IN AN ORGANIZED HEALTH CARE EDUCATION/TRAINING PROGRAM

## 2022-10-05 PROCEDURE — 80143 DRUG ASSAY ACETAMINOPHEN: CPT | Performed by: STUDENT IN AN ORGANIZED HEALTH CARE EDUCATION/TRAINING PROGRAM

## 2022-10-05 PROCEDURE — 83605 ASSAY OF LACTIC ACID: CPT | Performed by: STUDENT IN AN ORGANIZED HEALTH CARE EDUCATION/TRAINING PROGRAM

## 2022-10-05 PROCEDURE — 96368 THER/DIAG CONCURRENT INF: CPT

## 2022-10-05 PROCEDURE — 96361 HYDRATE IV INFUSION ADD-ON: CPT

## 2022-10-05 PROCEDURE — 80053 COMPREHEN METABOLIC PANEL: CPT | Performed by: STUDENT IN AN ORGANIZED HEALTH CARE EDUCATION/TRAINING PROGRAM

## 2022-10-05 PROCEDURE — 99291 CRITICAL CARE FIRST HOUR: CPT | Mod: 25

## 2022-10-05 PROCEDURE — 96367 TX/PROPH/DG ADDL SEQ IV INF: CPT

## 2022-10-05 PROCEDURE — 36415 COLL VENOUS BLD VENIPUNCTURE: CPT | Performed by: INTERNAL MEDICINE

## 2022-10-05 PROCEDURE — 999N000157 HC STATISTIC RCP TIME EA 10 MIN

## 2022-10-05 PROCEDURE — 250N000011 HC RX IP 250 OP 636: Performed by: INTERNAL MEDICINE

## 2022-10-05 PROCEDURE — 99291 CRITICAL CARE FIRST HOUR: CPT | Performed by: STUDENT IN AN ORGANIZED HEALTH CARE EDUCATION/TRAINING PROGRAM

## 2022-10-05 PROCEDURE — C9113 INJ PANTOPRAZOLE SODIUM, VIA: HCPCS | Performed by: INTERNAL MEDICINE

## 2022-10-05 PROCEDURE — 85041 AUTOMATED RBC COUNT: CPT | Performed by: STUDENT IN AN ORGANIZED HEALTH CARE EDUCATION/TRAINING PROGRAM

## 2022-10-05 PROCEDURE — 80306 DRUG TEST PRSMV INSTRMNT: CPT | Performed by: STUDENT IN AN ORGANIZED HEALTH CARE EDUCATION/TRAINING PROGRAM

## 2022-10-05 PROCEDURE — 96365 THER/PROPH/DIAG IV INF INIT: CPT

## 2022-10-05 PROCEDURE — 93005 ELECTROCARDIOGRAM TRACING: CPT

## 2022-10-05 PROCEDURE — 999N000065 XR CHEST PORT 1 VIEW

## 2022-10-05 PROCEDURE — 96366 THER/PROPH/DIAG IV INF ADDON: CPT

## 2022-10-05 PROCEDURE — 96375 TX/PRO/DX INJ NEW DRUG ADDON: CPT

## 2022-10-05 PROCEDURE — 84484 ASSAY OF TROPONIN QUANT: CPT | Performed by: INTERNAL MEDICINE

## 2022-10-05 PROCEDURE — 36600 WITHDRAWAL OF ARTERIAL BLOOD: CPT

## 2022-10-05 PROCEDURE — 94002 VENT MGMT INPAT INIT DAY: CPT

## 2022-10-05 PROCEDURE — 81003 URINALYSIS AUTO W/O SCOPE: CPT | Performed by: STUDENT IN AN ORGANIZED HEALTH CARE EDUCATION/TRAINING PROGRAM

## 2022-10-05 PROCEDURE — 82550 ASSAY OF CK (CPK): CPT | Performed by: STUDENT IN AN ORGANIZED HEALTH CARE EDUCATION/TRAINING PROGRAM

## 2022-10-05 PROCEDURE — 250N000009 HC RX 250: Performed by: INTERNAL MEDICINE

## 2022-10-05 PROCEDURE — 99223 1ST HOSP IP/OBS HIGH 75: CPT | Mod: AI | Performed by: INTERNAL MEDICINE

## 2022-10-05 PROCEDURE — 99292 CRITICAL CARE ADDL 30 MIN: CPT

## 2022-10-05 PROCEDURE — 250N000011 HC RX IP 250 OP 636: Performed by: STUDENT IN AN ORGANIZED HEALTH CARE EDUCATION/TRAINING PROGRAM

## 2022-10-05 PROCEDURE — 36415 COLL VENOUS BLD VENIPUNCTURE: CPT | Performed by: STUDENT IN AN ORGANIZED HEALTH CARE EDUCATION/TRAINING PROGRAM

## 2022-10-05 PROCEDURE — 83880 ASSAY OF NATRIURETIC PEPTIDE: CPT | Performed by: STUDENT IN AN ORGANIZED HEALTH CARE EDUCATION/TRAINING PROGRAM

## 2022-10-05 PROCEDURE — 200N000001 HC R&B ICU

## 2022-10-05 PROCEDURE — 250N000009 HC RX 250: Performed by: STUDENT IN AN ORGANIZED HEALTH CARE EDUCATION/TRAINING PROGRAM

## 2022-10-05 PROCEDURE — C9803 HOPD COVID-19 SPEC COLLECT: HCPCS

## 2022-10-05 PROCEDURE — U0005 INFEC AGEN DETEC AMPLI PROBE: HCPCS | Performed by: STUDENT IN AN ORGANIZED HEALTH CARE EDUCATION/TRAINING PROGRAM

## 2022-10-05 PROCEDURE — 70486 CT MAXILLOFACIAL W/O DYE: CPT

## 2022-10-05 PROCEDURE — 93010 ELECTROCARDIOGRAM REPORT: CPT | Performed by: INTERNAL MEDICINE

## 2022-10-05 PROCEDURE — 70450 CT HEAD/BRAIN W/O DYE: CPT

## 2022-10-05 PROCEDURE — 94640 AIRWAY INHALATION TREATMENT: CPT

## 2022-10-05 PROCEDURE — 86901 BLOOD TYPING SEROLOGIC RH(D): CPT | Performed by: STUDENT IN AN ORGANIZED HEALTH CARE EDUCATION/TRAINING PROGRAM

## 2022-10-05 PROCEDURE — 74174 CTA ABD&PLVS W/CONTRAST: CPT

## 2022-10-05 PROCEDURE — 71275 CT ANGIOGRAPHY CHEST: CPT

## 2022-10-05 PROCEDURE — 84484 ASSAY OF TROPONIN QUANT: CPT | Performed by: STUDENT IN AN ORGANIZED HEALTH CARE EDUCATION/TRAINING PROGRAM

## 2022-10-05 PROCEDURE — 94640 AIRWAY INHALATION TREATMENT: CPT | Mod: 76

## 2022-10-05 PROCEDURE — 258N000003 HC RX IP 258 OP 636: Performed by: INTERNAL MEDICINE

## 2022-10-05 PROCEDURE — 82805 BLOOD GASES W/O2 SATURATION: CPT | Performed by: STUDENT IN AN ORGANIZED HEALTH CARE EDUCATION/TRAINING PROGRAM

## 2022-10-05 PROCEDURE — 80179 DRUG ASSAY SALICYLATE: CPT | Performed by: STUDENT IN AN ORGANIZED HEALTH CARE EDUCATION/TRAINING PROGRAM

## 2022-10-05 PROCEDURE — 85610 PROTHROMBIN TIME: CPT | Performed by: STUDENT IN AN ORGANIZED HEALTH CARE EDUCATION/TRAINING PROGRAM

## 2022-10-05 PROCEDURE — 82140 ASSAY OF AMMONIA: CPT | Performed by: STUDENT IN AN ORGANIZED HEALTH CARE EDUCATION/TRAINING PROGRAM

## 2022-10-05 PROCEDURE — 82077 ASSAY SPEC XCP UR&BREATH IA: CPT | Performed by: STUDENT IN AN ORGANIZED HEALTH CARE EDUCATION/TRAINING PROGRAM

## 2022-10-05 RX ORDER — AMPICILLIN AND SULBACTAM 2; 1 G/1; G/1
3 INJECTION, POWDER, FOR SOLUTION INTRAMUSCULAR; INTRAVENOUS ONCE
Status: DISCONTINUED | OUTPATIENT
Start: 2022-10-05 | End: 2022-10-05

## 2022-10-05 RX ORDER — ALBUTEROL SULFATE 0.83 MG/ML
2.5 SOLUTION RESPIRATORY (INHALATION) EVERY 4 HOURS
Status: DISCONTINUED | OUTPATIENT
Start: 2022-10-05 | End: 2022-10-06

## 2022-10-05 RX ORDER — DICLOFENAC SODIUM 75 MG/1
75 TABLET, DELAYED RELEASE ORAL 2 TIMES DAILY
Status: DISCONTINUED | OUTPATIENT
Start: 2022-10-05 | End: 2022-10-06

## 2022-10-05 RX ORDER — PREDNISONE 5 MG/1
5 TABLET ORAL DAILY
Status: DISCONTINUED | OUTPATIENT
Start: 2022-10-06 | End: 2022-10-06

## 2022-10-05 RX ORDER — MIDAZOLAM HYDROCHLORIDE 5 MG/ML
5 INJECTION, SOLUTION INTRAMUSCULAR; INTRAVENOUS ONCE
Status: COMPLETED | OUTPATIENT
Start: 2022-10-05 | End: 2022-10-05

## 2022-10-05 RX ORDER — CHLORHEXIDINE GLUCONATE ORAL RINSE 1.2 MG/ML
15 SOLUTION DENTAL EVERY 12 HOURS
Status: DISCONTINUED | OUTPATIENT
Start: 2022-10-05 | End: 2022-10-06

## 2022-10-05 RX ORDER — NICOTINE POLACRILEX 4 MG
15-30 LOZENGE BUCCAL
Status: DISCONTINUED | OUTPATIENT
Start: 2022-10-05 | End: 2022-10-08 | Stop reason: HOSPADM

## 2022-10-05 RX ORDER — MIDAZOLAM HCL IN 0.9 % NACL/PF 1 MG/ML
1-8 PLASTIC BAG, INJECTION (ML) INTRAVENOUS CONTINUOUS
Status: DISCONTINUED | OUTPATIENT
Start: 2022-10-05 | End: 2022-10-05

## 2022-10-05 RX ORDER — DEXTROSE MONOHYDRATE 25 G/50ML
25-50 INJECTION, SOLUTION INTRAVENOUS
Status: DISCONTINUED | OUTPATIENT
Start: 2022-10-05 | End: 2022-10-08 | Stop reason: HOSPADM

## 2022-10-05 RX ORDER — IOPAMIDOL 755 MG/ML
90 INJECTION, SOLUTION INTRAVASCULAR ONCE
Status: COMPLETED | OUTPATIENT
Start: 2022-10-05 | End: 2022-10-05

## 2022-10-05 RX ORDER — LITHIUM CARBONATE 150 MG/1
300 CAPSULE ORAL 2 TIMES DAILY WITH MEALS
Status: DISCONTINUED | OUTPATIENT
Start: 2022-10-06 | End: 2022-10-06

## 2022-10-05 RX ORDER — LIDOCAINE 40 MG/G
CREAM TOPICAL
Status: DISCONTINUED | OUTPATIENT
Start: 2022-10-05 | End: 2022-10-05

## 2022-10-05 RX ORDER — ESCITALOPRAM OXALATE 5 MG/1
10 TABLET ORAL DAILY
Status: DISCONTINUED | OUTPATIENT
Start: 2022-10-06 | End: 2022-10-08 | Stop reason: HOSPADM

## 2022-10-05 RX ORDER — ESCITALOPRAM OXALATE 5 MG/1
5 TABLET ORAL DAILY
Status: DISCONTINUED | OUTPATIENT
Start: 2022-10-06 | End: 2022-10-08 | Stop reason: HOSPADM

## 2022-10-05 RX ORDER — PROPOFOL 10 MG/ML
5-75 INJECTION, EMULSION INTRAVENOUS CONTINUOUS
Status: DISCONTINUED | OUTPATIENT
Start: 2022-10-05 | End: 2022-10-06

## 2022-10-05 RX ADMIN — AMPICILLIN SODIUM AND SULBACTAM SODIUM 3 G: 2; 1 INJECTION, POWDER, FOR SOLUTION INTRAMUSCULAR; INTRAVENOUS at 17:12

## 2022-10-05 RX ADMIN — MIDAZOLAM HYDROCHLORIDE 5 MG: 5 INJECTION, SOLUTION INTRAMUSCULAR; INTRAVENOUS at 15:25

## 2022-10-05 RX ADMIN — PANTOPRAZOLE SODIUM 40 MG: 40 INJECTION, POWDER, FOR SOLUTION INTRAVENOUS at 22:12

## 2022-10-05 RX ADMIN — MIDAZOLAM HYDROCHLORIDE 1 MG/HR: 1 INJECTION, SOLUTION INTRAVENOUS at 15:47

## 2022-10-05 RX ADMIN — ALBUTEROL SULFATE 2.5 MG: 2.5 SOLUTION RESPIRATORY (INHALATION) at 21:50

## 2022-10-05 RX ADMIN — PROPOFOL 10 MCG/KG/MIN: 10 INJECTION, EMULSION INTRAVENOUS at 17:40

## 2022-10-05 RX ADMIN — CHLORHEXIDINE GLUCONATE ORAL RINSE 15 ML: 1.2 SOLUTION DENTAL at 22:18

## 2022-10-05 RX ADMIN — DEXTROSE AND SODIUM CHLORIDE: 5; 900 INJECTION, SOLUTION INTRAVENOUS at 22:23

## 2022-10-05 RX ADMIN — IOPAMIDOL 90 ML: 755 INJECTION, SOLUTION INTRAVENOUS at 16:33

## 2022-10-05 RX ADMIN — ALBUTEROL SULFATE 2.5 MG: 2.5 SOLUTION RESPIRATORY (INHALATION) at 23:59

## 2022-10-05 RX ADMIN — Medication 155 MG: at 16:04

## 2022-10-05 ASSESSMENT — ACTIVITIES OF DAILY LIVING (ADL)
ADLS_ACUITY_SCORE: 35

## 2022-10-05 NOTE — ED NOTES
Arrived to ER room 9 via [facility]. Dr. Ohara at bedside upon patient arrival. Time out for EMS report. EMS reports they were called to patient's home for possible overdose and being unresponsive on the cement floor next to his bed for an unknown time. Patient is a known meth user. EMS states patient's bedroom is in the basement of the home with cement jayson and he was on the floor surrounded by what appeared to be GI bleed and was semi unresponsive but would become combative whenever EMS attempted to get vitals or assess. EMS gave Versed 5 mg IM and when that took affect they were able to care for patient. They started an 18 G IV to RAC. SpO2 was 30% on NRB. EMS gave a total of Narcan 8 mg IV, 2 mg each at 1438, 1445, 1450, and 1452. RSI was done with Ketamine 280 mg x2 and placed 8.0 ET tube secured at 25 cm at the teeth. EMS started 1 liter bolus of NS. Patient transferred from EMS stretcher to ER bed with assist of 5. RT at bedside with vent and assumed care of respirations. Placed on monitors. Dark maroon/black dried blood noted around nares, upper lip, chin, on arms, and on hands. RT, Dr. Ohara, and 2 RN's at bedside.

## 2022-10-05 NOTE — ED NOTES
Patient lightly sedated and starting to wake up some. Lifting arms up toward ET tube and turning head. Medical healing soft restraints placed at this time. Dr. Ohara at bedside and aware.

## 2022-10-05 NOTE — ED PROVIDER NOTES
History     Chief Complaint   Patient presents with     Altered Mental Status     HPI  Lino Nava is a 41 year old male with a polysubstance abuse history presents to the emergency department today intubated.  According to EMS the patient was found down and EMS was dispatched, when they initially tried to interact with him he was somehow simultaneously nonresponsive and combative.  He was given 5 mg Versed and they were able to get vitals and noted him oxygenating 30%.  He was given Narcan 2 mg x 4 for a total of 8 mg without any improvement.  He was then intubated.  Ketamine was given for intubation which sounds like it proceeded well.  Patient is unable to provide any history.    Wife says pt is addicted to drugs. Balta = brother. La = Balta's gf (who keeps wife informed about what's going on) .  She notes that he was acting weird yesterday. He was having conversations with himself, flopping like a fish out of water similar to when he uses meth. La came home today and found him in a pool of blood. She called 911.     Allergies:  No Known Allergies    Problem List:    Patient Active Problem List    Diagnosis Date Noted     Aspiration pneumonitis (H) 10/05/2022     Priority: Medium     Acute respiratory failure with hypoxia (H) 10/05/2022     Priority: Medium     Amphetamine abuse (H) 10/05/2022     Priority: Medium     Traumatic rhabdomyolysis (H) 10/05/2022     Priority: Medium     Epistaxis 10/05/2022     Priority: Medium     Major depressive disorder, recurrent episode, moderate (H) 12/10/2021     Priority: Medium     History of substance abuse (H) 05/04/2020     Priority: Medium     Methamphetamine       Chronic bilateral low back pain without sciatica 02/25/2020     Priority: Medium     KADE (generalized anxiety disorder) 06/29/2018     Priority: Medium     History of leukemia 06/29/2018     Priority: Medium     as a child       Obesity (BMI 30.0-34.9) 10/25/2017     Priority: Medium     LPRD  (laryngopharyngeal reflux disease) 03/08/2022     Priority: Low        Past Medical History:    Past Medical History:   Diagnosis Date     KADE (generalized anxiety disorder)      History of leukemia      History of substance abuse (H)      Mild major depression (H)      Obesity (BMI 30.0-34.9)      Olecranon bursitis 07/26/2020       Past Surgical History:    No past surgical history on file.    Family History:    No family history on file.    Social History:  Marital Status:  Single [1]  Social History     Tobacco Use     Smoking status: Former Smoker     Smokeless tobacco: Never Used   Substance Use Topics     Alcohol use: Not Currently     Drug use: No        Medications:    No current outpatient medications on file.        Review of Systems  Patient intubated and sedated  Physical Exam   BP: 118/65  Pulse: 112  Temp: 97.1  F (36.2  C)  Resp: 12  Weight: 102.6 kg (226 lb 3.1 oz)  SpO2: (!) 86 %      Physical Exam  Constitutional: Intubated and sedated  HENT: Blood at the nares no septal hematoma  Eyes: Pinned pupils  Neck: supple   CV: Tachycardic rate, regular rhythm, no murmur   Pulmonary/Chest: Intubated, right lung sounds worse than the left, but no sounds over the epigastrium and air movement is present in both  Abdominal: Soft, non-distended   MSK: Not moving extremities  Neuro: Intubated and sedated, occasionally grimacing  Skin: Warm and dry. No diaphoresis. No rashes on exposed skin    Psych: Unable to assess    ED Course              ED Course as of 10/06/22 0736   Wed Oct 05, 2022   1604 41-year-old male presents here intubated.  There is dried blood on his face his oxygen was quite low.  Quite concerned for   1652 CTA Chest Abdomen Pelvis w Contrast   1652 CT Head w/o Contrast  No abnormalities   1652 CT Cervical Spine w/o Contrast  1.  No acute fracture or traumatic subluxation.  2.  Multifocal patchy ground glass and consolidative opacities in  right upper lung, concerning for infection.   1656 CTA  Chest Abdomen Pelvis w Contrast  1.  Diffuse right lung consolidations and scattered multifocal pulmonary opacities, likely represent infection, less likely aspiration, inhalational injury.  2.  No aortic dissection, stenosis, or acute abnormality. Mild proximal aortic dilatation up to 4.2 cm.  3.  Soft tissue density related to the right inguinal canal, likely represents undescended right testicle.   1836 Per Pulmonology from [facility]: Recommend Heated humidified circuit on the Vent. Schedule albuterol or duos q4h to mobilize secretions, Also nebulizing consider 7% saline through the tubing q6h.    Thu Oct 06, 2022   0855 I gave verbal instructions to place the patient in non-violent restraints to Nurse Sophia. She documented the time. I overlooked placing the computer order. Risks and benefits were weighed and to protect the patient from extubating himself while intubated, the restraints were placed.   0728 Pt admitted to the ICU     Procedures         Results for orders placed or performed during the hospital encounter of 10/05/22 (from the past 24 hour(s))   XR Chest Port 1 View    Narrative    PROCEDURE:  XR CHEST PORT 1 VIEW    HISTORY: intubation    COMPARISON:  None.    FINDINGS: Single view chest radiograph  Endotracheal tube tip projects over the upper thoracic trachea,  approximately 5.1 cm from the keri.  Cardiomediastinal silhouette is  within normal limits. Patchy multifocal round glass and consolidative  opacities in the right lung. Pleural spaces are clear. No  subdiaphragmatic free air.      Impression    IMPRESSION:   1.  Endotracheal tube tip is within normal limits and projects the  upper thoracic trachea, approximately 5.1 cm from the keri.   2.  Multifocal right lung patchy groundglass and consolidative  opacities, concerning for infection, less likely inhalational injury.    DARSHAN SWANN MD         SYSTEM ID:  [id number]   CBC with platelets differential    Narrative    The following  orders were created for panel order CBC with platelets differential.  Procedure                               Abnormality         Status                     ---------                               -----------         ------                     CBC with platelets and d...[[id number]]  Abnormal            Final result                 Please view results for these tests on the individual orders.   Comprehensive metabolic panel   Result Value Ref Range    Sodium 141 133 - 144 mmol/L    Potassium 4.2 3.4 - 5.3 mmol/L    Chloride 108 94 - 109 mmol/L    Carbon Dioxide (CO2) 28 20 - 32 mmol/L    Anion Gap 5 3 - 14 mmol/L    Urea Nitrogen 29 7 - 30 mg/dL    Creatinine 0.86 0.66 - 1.25 mg/dL    Calcium 8.4 (L) 8.5 - 10.1 mg/dL    Glucose 103 (H) 70 - 99 mg/dL    Alkaline Phosphatase 80 40 - 150 U/L    AST 23 0 - 45 U/L    ALT 27 0 - 70 U/L    Protein Total 7.0 6.8 - 8.8 g/dL    Albumin 3.4 3.4 - 5.0 g/dL    Bilirubin Total 0.5 0.2 - 1.3 mg/dL    GFR Estimate >90 >60 mL/min/1.73m2   Lactic acid whole blood   Result Value Ref Range    Lactic Acid 2.0 0.7 - 2.0 mmol/L   Troponin I   Result Value Ref Range    Troponin I High Sensitivity 27 <79 ng/L   Nt probnp inpatient (BNP)   Result Value Ref Range    N terminal Pro BNP Inpatient 220 0 - 450 pg/mL   TSH with free T4 reflex   Result Value Ref Range    TSH 0.98 0.40 - 4.00 mU/L   Ethyl Alcohol Level   Result Value Ref Range    Alcohol ethyl <0.01 <=0.01 g/dL   INR   Result Value Ref Range    INR 0.95 0.85 - 1.15   CK Total   Result Value Ref Range     (H) 30 - 300 U/L   ABO/Rh type and screen    Narrative    The following orders were created for panel order ABO/Rh type and screen.  Procedure                               Abnormality         Status                     ---------                               -----------         ------                     Adult Type and Screen[[id number]]                            Final result                 Please view results for these tests  on the individual orders.   Ammonia   Result Value Ref Range    Ammonia <10 (L) 10 - 50 umol/L   [place] Draw    Narrative    The following orders were created for panel order [place] Draw.  Procedure                               Abnormality         Status                     ---------                               -----------         ------                     Extra Blue Top Tube[[id number]]                              Final result               Extra Red Top Tube[[id number]]                               Final result               Extra Green Top (Lithium...[[id number]]                      Final result               Extra Purple Top Tube[[id number]]                            Final result               Extra Blood Bank Purple ...[[id number]]                      Final result               Extra Green Top (Lithium...[[id number]]                      Final result                 Please view results for these tests on the individual orders.   CBC with platelets and differential   Result Value Ref Range    WBC Count 2.7 (L) 4.0 - 11.0 10e3/uL    RBC Count 5.32 4.40 - 5.90 10e6/uL    Hemoglobin 15.8 13.3 - 17.7 g/dL    Hematocrit 47.7 40.0 - 53.0 %    MCV 90 78 - 100 fL    MCH 29.7 26.5 - 33.0 pg    MCHC 33.1 31.5 - 36.5 g/dL    RDW 14.0 10.0 - 15.0 %    Platelet Count 233 150 - 450 10e3/uL    % Neutrophils 88 %    % Lymphocytes 6 %    % Monocytes 6 %    % Eosinophils 0 %    % Basophils 0 %    % Immature Granulocytes 0 %    NRBCs per 100 WBC 0 <1 /100    Absolute Neutrophils 2.4 1.6 - 8.3 10e3/uL    Absolute Lymphocytes 0.2 (L) 0.8 - 5.3 10e3/uL    Absolute Monocytes 0.2 0.0 - 1.3 10e3/uL    Absolute Eosinophils 0.0 0.0 - 0.7 10e3/uL    Absolute Basophils 0.0 0.0 - 0.2 10e3/uL    Absolute Immature Granulocytes 0.0 <=0.4 10e3/uL    Absolute NRBCs 0.0 10e3/uL   Adult Type and Screen   Result Value Ref Range    ABO/RH(D) A POS     Antibody Screen Negative Negative    SPECIMEN EXPIRATION DATE [id number]    Extra Blue Top  Tube   Result Value Ref Range    Hold Specimen JIC    Extra Red Top Tube   Result Value Ref Range    Hold Specimen JIC    Extra Green Top (Lithium Heparin) Tube   Result Value Ref Range    Hold Specimen JIC    Extra Purple Top Tube   Result Value Ref Range    Hold Specimen JIC    Extra Blood Bank Purple Top Tube   Result Value Ref Range    Hold Specimen JIC    Extra Green Top (Lithium Heparin) ON ICE   Result Value Ref Range    Hold Specimen JIC    Acetaminophen level   Result Value Ref Range    Acetaminophen <2 (L) 10 - 30 mg/L   Salicylate level   Result Value Ref Range    Salicylate <2 <20 mg/dL   Lithium level   Result Value Ref Range    Lithium <0.2   mmol/L   Asymptomatic COVID-19 Virus (Coronavirus) by PCR Nasopharyngeal    Specimen: Nasopharyngeal; Swab   Result Value Ref Range    SARS CoV2 PCR Negative Negative    Narrative    Testing was performed using the Xpert Xpress SARS-CoV-2 Assay on the   Cepheid Gene-Xpert Instrument Systems. Additional information about   this Emergency Use Authorization (EUA) assay can be found via the Lab   Guide. This test should be ordered for the detection of SARS-CoV-2 in   individuals who meet SARS-CoV-2 clinical and/or epidemiological   criteria. Test performance is unknown in asymptomatic patients. This   test is for in vitro diagnostic use under the FDA EUA for   laboratories certified under CLIA to perform high complexity testing.   This test has not been FDA cleared or approved. A negative result   does not rule out the presence of PCR inhibitors in the specimen or   target RNA in concentration below the limit of detection for the   assay. The possibility of a false negative should be considered if   the patient's recent exposure or clinical presentation suggests   COVID-19. This test was validated by [facility]. This laboratory is certified under the Clinical Laboratory Improve  ment Amendments (CLIA) as qualified to perform high  complexity testing.   Blood gas arterial   Result Value Ref Range    pH Arterial 7.29 (L) 7.35 - 7.45    pCO2 Arterial 53 (H) 35 - 45 mm Hg    pO2 Arterial 69 (L) 80 - 105 mm Hg    FIO2 90     Bicarbonate Arterial 25 21 - 28 mmol/L    Base Excess/Deficit (+/-) -2.3 -9.0 - 1.8 mmol/L   CT Head w/o Contrast    Narrative    EXAM: CT HEAD W/O CONTRAST 10/5/2022 4:35 PM    PROVIDED HISTORY: AMS    COMPARISON: None    TECHNIQUE:   Imaging protocol: Multiplanar CT images of the head without  intravenous contrast.   Acquisition: This CT exam was performed using one or more the  following dose reduction techniques: automated exposure control,  adjustment of the mA and/or kV according to patient size, and/or  iterative reconstruction technique.    FINDINGS:  No intracranial hemorrhage, mass effect, or midline shift. The  ventricles are proportionate to the cerebral sulci. Normal variant  mary lou cisterna magna. No acute loss of gray-white matter  differentiation.    No acute osseous abnormality. Mild paranasal sinus mucosal thickening.  Left greater than right mastoid effusions. The orbits are grossly  unremarkable.       Impression    IMPRESSION:  No acute intracranial abnormality.    DARSHAN SWANN MD         SYSTEM ID:  [id number]   CT Cervical Spine w/o Contrast    Narrative    EXAM: CT CERVICAL SPINE W/O CONTRAST 10/5/2022 4:37 PM    PROVIDED HISTORY: AMS    COMPARISON: None    TECHNIQUE:   Imaging protocol: Using multidetector thin collimation helical  acquisition technique, axial, coronal and sagittal CT images through  the cervical spine were obtained without intravenous contrast.   Acquisition: This CT exam was performed using one or more the  following dose reduction techniques: automated exposure control,  adjustment of the mA and/or kV according to patient size, and/or  iterative reconstruction technique.    FINDINGS:  The cervical vertebrae are normally aligned. Normal lordotic curvature  of the cervical spine. No  findings of acute fracture or traumatic  subluxation. No prevertebral edema. There is no disc height narrowing  at any level. Mild uncinate and facet hypertrophy on the right at C3-4  resulting in moderate right foraminal narrowing, otherwise no  significant foraminal narrowing. No significant spinal canal narrowing  at any level.      No acute abnormality of the paraspinous soft tissues. Multifocal  patchy consolidative right upper lung opacities.      Impression    IMPRESSION:   1.  No acute fracture or traumatic subluxation.  2.  Multifocal patchy ground glass and consolidative opacities in  right upper lung, concerning for infection.    DARSHAN SWANN MD         SYSTEM ID:  [id number]   CTA Chest Abdomen Pelvis w Contrast    Narrative    EXAMINATION: CTA CHEST ABDOMEN PELVIS W CONTRAST, 10/5/2022 4:37 PM    COMPARISON: Chest radiograph 10/5/2022    HISTORY: AMS evidence of trauma, intubated R/O Dissection    TECHNIQUE:  Imaging protocol: Helical post-contrast CT images of chest, abdomen  and pelvis with reconstructions in 3 planes for angiographic  evaluation. Meds given: Isovue 370 90ml Given.  Acquisition: This CT exam was performed using one or more the  following dose reduction techniques: automated exposure control,  adjustment of the mA and/or kV according to patient size, and/or  iterative reconstruction technique.  Processing: 3D rendering on independent workstation using Maximum  Intensity Projection (MIP) was performed and archived to PACS. 3D  reconstructions are interpreted and reported by supervising  radiologist.    FINDINGS:    CHEST:  LUNG: Diffuse patchy consolidation of the right lower lobe and to a  lesser degree the right upper and right middle lobe. Additional  scattered patchy ground glass consolidative opacities in the right  lung to a lesser degree the left lung.  AIRWAYS: Endotracheal tube terminates the upper thoracic trachea.  Central airways are patent.  PLEURA: No pleural effusion or  pneumothorax.  VESSELS: Patent major thoracic vasculature. Ascending aorta is mildly  dilated proximally and measures 4.2 at the proximal arch, no  dilatation of the distal arch or descending aorta. No aortic  dissection, stenosis, or acute abnormality.  HEART: Within normal limits.  LYMPH NODES: Enlarged mediastinal and right greater than left hilar  lymph nodes  THYROID: No thyroid nodules.    ABDOMEN/PELVIS:  LIVER: Normal contour. No suspicious liver lesions. Fatty infiltration  along the falciform ligament.  GALLBLADDER: Within normal limits.  BILE DUCTS: No biliary tract dilatation.  PANCREAS: Within normal limits.  SPLEEN: No splenomegaly. Left upper quadrant splenules.  ADRENALS: Within normal limits.  KIDNEYS/URETERS: Within normal limits.  URINARY BLADDER: Within normal limits.  REPRODUCTIVE ORGANS: Soft tissue density in the region of the right  inguinal canal measuring 4.2 x 2.1 cm, suspicious for undescended  testicle.    BOWEL: No bowel dilatation or wall thickening. Normal appendix  PERITONEUM/RETROPERITONEUM: No free fluid or free air.  VESSELS: Patent major abdominal vasculature. Aorta appears within  normal limits and demonstrates no dissection, stenosis, or acute  abnormality.  LYMPH NODES: There are no pathologically enlarged lymph nodes.    BONES AND SOFT TISSUES:  No suspicious osseous lesions. Degenerative periarticular changes and  spinal spondylosis.      Impression    IMPRESSION:  1.  Diffuse right lung consolidations and scattered multifocal  pulmonary opacities, likely represent infection, less likely  aspiration, inhalational injury.  2.  No aortic dissection, stenosis, or acute abnormality. Mild  proximal aortic dilatation up to 4.2 cm.  3.  Soft tissue density related to the right inguinal canal, likely  represents undescended right testicle.    DARSHAN SWANN MD         SYSTEM ID:  [id number]   UA with Microscopic reflex to Culture    Specimen: Urine, Catheter   Result Value Ref Range     Color Urine Yellow Colorless, Straw, Light Yellow, Yellow    Appearance Urine Clear Clear    Glucose Urine Negative Negative mg/dL    Bilirubin Urine Negative Negative    Ketones Urine Negative Negative mg/dL    Specific Gravity Urine 1.031 1.003 - 1.035    Blood Urine Negative Negative    pH Urine 5.5 4.7 - 8.0    Protein Albumin Urine 30  (A) Negative mg/dL    Urobilinogen Urine Normal Normal, 2.0 mg/dL    Nitrite Urine Negative Negative    Leukocyte Esterase Urine Negative Negative    Mucus Urine Present (A) None Seen /LPF    RBC Urine 0 <=2 /HPF    WBC Urine 2 <=5 /HPF    Squamous Epithelials Urine 0 <=1 /HPF    Hyaline Casts Urine 24 (H) <=2 /LPF    Narrative    Urine Culture not indicated   Urine Drugs of Abuse Screen    Narrative    The following orders were created for panel order Urine Drugs of Abuse Screen.  Procedure                               Abnormality         Status                     ---------                               -----------         ------                     Urine Drugs of Abuse Scr...[[id number]]  Abnormal            Final result                 Please view results for these tests on the individual orders.   Urine Drugs of Abuse Screen Panel 13   Result Value Ref Range    Cannabinoids (01-odc-9-carboxy-9-THC) Detected (A) Not Detected, Indeterminate    Phencyclidine Not Detected Not Detected, Indeterminate    Cocaine (Benzoylecgonine) Not Detected Not Detected, Indeterminate    Methamphetamine (d-Methamphetamine) Detected (A) Not Detected, Indeterminate    Opiates (Morphine) Not Detected Not Detected, Indeterminate    Amphetamine (d-Amphetamine) Detected (A) Not Detected, Indeterminate    Benzodiazepines (Nordiazepam) Detected (A) Not Detected, Indeterminate    Tricyclic Antidepressants (Desipramine) Not Detected Not Detected, Indeterminate    Methadone Not Detected Not Detected, Indeterminate    Barbiturates (Butalbital) Not Detected Not Detected, Indeterminate    Oxycodone Not  Detected Not Detected, Indeterminate    Propoxyphene (Norpropoxyphene) Not Detected Not Detected, Indeterminate    Buprenorphine Detected (A) Not Detected, Indeterminate   Blood gas arterial and oxyhgb   Result Value Ref Range    pH Arterial 7.32 (L) 7.35 - 7.45    pCO2 Arterial 49 (H) 35 - 45 mm Hg    pO2 Arterial 237 (H) 80 - 105 mm Hg    Bicarbonate Arterial 25 21 - 28 mmol/L    Oxyhemoglobin Arterial 100 92 - 100 %    Base Excess/Deficit (+/-) -1.8 -9.0 - 1.8 mmol/L    FIO2 90    CT Facial Bones without Contrast    Narrative    CT FACIAL BONES WITHOUT CONTRAST    HISTORY: 41 years Male trauma intubated    COMPARISON: None    TECHNIQUE: Axial CT imaging of the facial bones was performed. Coronal  and sagittal reconstructions were obtained.    FINDINGS: An endotracheal tube is present. An oral gastric tube is  present. There is also thickening within the ethmoid air cells and to  a lesser extent the sinuses. There is no evidence of facial fracture.  The orbits are intact.      Impression    IMPRESSION: No evidence of facial fracture.    YOON TAYLOR MD         SYSTEM ID:  [id number]   Lactic acid whole blood   Result Value Ref Range    Lactic Acid 2.1 (H) 0.7 - 2.0 mmol/L   Troponin I   Result Value Ref Range    Troponin I High Sensitivity 27 <79 ng/L   Glucose by meter   Result Value Ref Range    GLUCOSE BY METER POCT 98 70 - 99 mg/dL   Hemoglobin and hematocrit   Result Value Ref Range    Hemoglobin 13.9 13.3 - 17.7 g/dL    Hematocrit 40.1 40.0 - 53.0 %   Hemoglobin and hematocrit   Result Value Ref Range    Hemoglobin 13.8 13.3 - 17.7 g/dL    Hematocrit 39.9 (L) 40.0 - 53.0 %   Basic metabolic panel   Result Value Ref Range    Sodium 142 133 - 144 mmol/L    Potassium 4.1 3.4 - 5.3 mmol/L    Chloride 113 (H) 94 - 109 mmol/L    Carbon Dioxide (CO2) 25 20 - 32 mmol/L    Anion Gap 4 3 - 14 mmol/L    Urea Nitrogen 24 7 - 30 mg/dL    Creatinine 0.79 0.66 - 1.25 mg/dL    Calcium 8.3 (L) 8.5 - 10.1 mg/dL     Glucose 145 (H) 70 - 99 mg/dL    GFR Estimate >90 >60 mL/min/1.73m2   CK total   Result Value Ref Range    CK 1,412 (HH) 30 - 300 U/L   Procalcitonin   Result Value Ref Range    Procalcitonin 4.73 (H) <0.05 ng/mL   CRP inflammation   Result Value Ref Range    CRP Inflammation 159.0 (H) 0.0 - 8.0 mg/L   Blood gas arterial and oxyhgb   Result Value Ref Range    pH Arterial 7.40 7.35 - 7.45    pCO2 Arterial 42 35 - 45 mm Hg    pO2 Arterial 79 (L) 80 - 105 mm Hg    Bicarbonate Arterial 26 21 - 28 mmol/L    Oxyhemoglobin Arterial 96 92 - 100 %    Base Excess/Deficit (+/-) 1.1 -9.0 - 1.8 mmol/L    FIO2 30    Glucose by meter   Result Value Ref Range    GLUCOSE BY METER POCT 110 (H) 70 - 99 mg/dL   Lactic Acid STAT   Result Value Ref Range    Lactic Acid 1.3 0.7 - 2.0 mmol/L   CBC with platelets   Result Value Ref Range    WBC Count 7.5 4.0 - 11.0 10e3/uL    RBC Count 4.46 4.40 - 5.90 10e6/uL    Hemoglobin 13.3 13.3 - 17.7 g/dL    Hematocrit 39.1 (L) 40.0 - 53.0 %    MCV 88 78 - 100 fL    MCH 29.8 26.5 - 33.0 pg    MCHC 34.0 31.5 - 36.5 g/dL    RDW 14.4 10.0 - 15.0 %    Platelet Count 188 150 - 450 10e3/uL       Medications   propofol (DIPRIVAN) infusion (30 mcg/kg/min × 102.6 kg Intravenous Rate/Dose Change 10/6/22 0721)   lithium (ESKALITH) capsule 300 mg ( Oral Automatically Held 10/9/22 1700)   diclofenac (VOLTAREN) EC tablet 75 mg ( Oral Automatically Held 10/11/22 2100)   escitalopram (LEXAPRO) tablet 10 mg ( Oral Automatically Held 10/9/22 0900)   escitalopram (LEXAPRO) tablet 5 mg ( Oral Automatically Held 10/9/22 0900)   predniSONE (DELTASONE) tablet 5 mg ( Oral Automatically Held 10/9/22 0900)   pantoprazole (PROTONIX) IV push injection 40 mg (40 mg Intravenous Given 10/5/22 2212)   chlorhexidine (PERIDEX) 0.12 % solution 15 mL (15 mLs Mouth/Throat Given 10/5/22 2218)   glucose gel 15-30 g (has no administration in time range)     Or   dextrose 50 % injection 25-50 mL (has no administration in time range)     Or    glucagon injection 1 mg (has no administration in time range)   albuterol (PROVENTIL) neb solution 2.5 mg (2.5 mg Nebulization Given 10/6/22 0333)   dextrose 5% and 0.9% NaCl infusion ( Intravenous Rate/Dose Verify 10/6/22 0637)   fentaNYL (PF) (SUBLIMAZE) injection 50 mcg (50 mcg Intravenous Given 10/6/22 0531)   ampicillin-sulbactam (UNASYN) 3 g in sodium chloride 0.9 % 100 mL intermittent infusion (has no administration in time range)   midazolam (VERSED) injection 5 mg (5 mg Intravenous Given 10/5/22 1525)   ketamine (KETALAR) injection 155 mg (155 mg Intravenous Given 10/5/22 1604)   ampicillin-sulbactam (UNASYN) 3 g in sodium chloride 0.9 % 100 mL intermittent infusion (0 g Intravenous Stopped 10/5/22 1732)   iopamidol (ISOVUE-370) solution 90 mL (90 mLs Intravenous Given 10/5/22 1633)   sodium chloride (PF) 0.9% PF flush 100 mL (100 mLs Intravenous Given 10/5/22 1633)   0.9% sodium chloride BOLUS (1,000 mLs Intravenous New Bag 10/6/22 0046)       Assessments & Plan (with Medical Decision Making)     I have reviewed the nursing notes.    I have reviewed the findings, diagnosis, plan and need for follow up with the patient.     Critical Care Addendum    My initial assessment, based on my review of prehospital provider report, review of nursing observations, review of vital signs, focused history, physical exam, review of cardiac rhythm monitor and 12 lead ECG analysis, established that Lino Nava has altered mental status and respiratory failure, which requires immediate intervention, and therefore he is critically ill.     After the initial assessment, the care team initiated multiple lab tests, initiated IV fluid administration, initiated medication therapy with above, performed advanced airway management and consulted with pulmonology to provide stabilization care. Due to the critical nature of this patient, I reassessed nursing observations, vital signs, physical exam, review of cardiac rhythm  monitor, 12 lead ECG analysis, mental status and respiratory status multiple times prior to his disposition.     Time also spent performing documentation, discussion with family to obtain medical information for decision making, reviewing test results, discussion with consultants and coordination of care.     Critical care time (excluding teaching time and procedures): 75 minutes.     Current Discharge Medication List          Final diagnoses:   Acute respiratory failure with hypoxia (H)   Aspiration pneumonitis (H)       10/5/2022   [facility]     Jaren Ohara MD  10/05/22 1940       Jaren Ohara MD  10/06/22 0736

## 2022-10-05 NOTE — ED NOTES
Face to face report given with opportunity to observe patient.    Report given to LUKASZ Quijano RN   10/4/2022  7:03 PM

## 2022-10-05 NOTE — ED NOTES
Patient reports that he is a former meth user. Patient states he smoked meth a day or 2 ago he does not remember. Patient stated he did 1 year inpatient treatment and then also did 2 years of outpatient treatment. Patient denies any additional needs at this time.

## 2022-10-05 NOTE — ED NOTES
Patient arrives by [facility] after being found unresponsive, laying in blood at the bottom of the stairs.   Patient was intubated and being ventilated via BVM by [facility].

## 2022-10-05 NOTE — ED NOTES
Patient's brother here to pick patient up. Patient and brother provided with written and verbal discharge instructions. Patient's belongings retrieved from locker A and returned to patient. Patient left ambulatory with brother.

## 2022-10-05 NOTE — PROGRESS NOTES
Pt to ED per paramedics. Pt intubated upon arrival, 8 oral ETT secure 25 cm at the lip. BBS equal . PT placed on maksim 300 A/C 14/min, Vt 550 ml, Peep 8 Fio2 1.0 Cxr obtained, suctioned large amount of coffee ground thin secretions via ETT.

## 2022-10-06 LAB
ANION GAP SERPL CALCULATED.3IONS-SCNC: 4 MMOL/L (ref 3–14)
BASE EXCESS BLDA CALC-SCNC: 1.1 MMOL/L (ref -9–1.8)
BUN SERPL-MCNC: 24 MG/DL (ref 7–30)
CALCIUM SERPL-MCNC: 8.3 MG/DL (ref 8.5–10.1)
CHLORIDE BLD-SCNC: 113 MMOL/L (ref 94–109)
CK SERPL-CCNC: 1412 U/L (ref 30–300)
CK SERPL-CCNC: 1877 U/L (ref 30–300)
CO2 SERPL-SCNC: 25 MMOL/L (ref 20–32)
CREAT SERPL-MCNC: 0.79 MG/DL (ref 0.66–1.25)
CRP SERPL-MCNC: 159 MG/L (ref 0–8)
ERYTHROCYTE [DISTWIDTH] IN BLOOD BY AUTOMATED COUNT: 14.4 % (ref 10–15)
GFR SERPL CREATININE-BSD FRML MDRD: >90 ML/MIN/1.73M2
GLUCOSE BLD-MCNC: 145 MG/DL (ref 70–99)
GLUCOSE BLDC GLUCOMTR-MCNC: 102 MG/DL (ref 70–99)
GLUCOSE BLDC GLUCOMTR-MCNC: 102 MG/DL (ref 70–99)
GLUCOSE BLDC GLUCOMTR-MCNC: 103 MG/DL (ref 70–99)
GLUCOSE BLDC GLUCOMTR-MCNC: 110 MG/DL (ref 70–99)
GLUCOSE BLDC GLUCOMTR-MCNC: 74 MG/DL (ref 70–99)
HCO3 BLD-SCNC: 26 MMOL/L (ref 21–28)
HCT VFR BLD AUTO: 38.3 % (ref 40–53)
HCT VFR BLD AUTO: 39.1 % (ref 40–53)
HCT VFR BLD AUTO: 39.9 % (ref 40–53)
HCT VFR BLD AUTO: 40.1 % (ref 40–53)
HGB BLD-MCNC: 13.1 G/DL (ref 13.3–17.7)
HGB BLD-MCNC: 13.3 G/DL (ref 13.3–17.7)
HGB BLD-MCNC: 13.8 G/DL (ref 13.3–17.7)
HGB BLD-MCNC: 13.9 G/DL (ref 13.3–17.7)
HOLD SPECIMEN: NORMAL
LACTATE SERPL-SCNC: 1.3 MMOL/L (ref 0.7–2)
MCH RBC QN AUTO: 29.8 PG (ref 26.5–33)
MCHC RBC AUTO-ENTMCNC: 34 G/DL (ref 31.5–36.5)
MCV RBC AUTO: 88 FL (ref 78–100)
O2/TOTAL GAS SETTING VFR VENT: 30 %
OXYHGB MFR BLD: 96 % (ref 92–100)
PCO2 BLD: 42 MM HG (ref 35–45)
PH BLD: 7.4 [PH] (ref 7.35–7.45)
PLATELET # BLD AUTO: 188 10E3/UL (ref 150–450)
PO2 BLD: 79 MM HG (ref 80–105)
POTASSIUM BLD-SCNC: 4.1 MMOL/L (ref 3.4–5.3)
PROCALCITONIN SERPL-MCNC: 4.73 NG/ML
RBC # BLD AUTO: 4.46 10E6/UL (ref 4.4–5.9)
SODIUM SERPL-SCNC: 142 MMOL/L (ref 133–144)
WBC # BLD AUTO: 7.5 10E3/UL (ref 4–11)

## 2022-10-06 PROCEDURE — 84145 PROCALCITONIN (PCT): CPT | Performed by: INTERNAL MEDICINE

## 2022-10-06 PROCEDURE — 82550 ASSAY OF CK (CPK): CPT | Performed by: INTERNAL MEDICINE

## 2022-10-06 PROCEDURE — 94640 AIRWAY INHALATION TREATMENT: CPT | Mod: 76

## 2022-10-06 PROCEDURE — 999N000157 HC STATISTIC RCP TIME EA 10 MIN

## 2022-10-06 PROCEDURE — 85018 HEMOGLOBIN: CPT | Performed by: INTERNAL MEDICINE

## 2022-10-06 PROCEDURE — 86140 C-REACTIVE PROTEIN: CPT | Performed by: INTERNAL MEDICINE

## 2022-10-06 PROCEDURE — 258N000003 HC RX IP 258 OP 636: Performed by: INTERNAL MEDICINE

## 2022-10-06 PROCEDURE — C9113 INJ PANTOPRAZOLE SODIUM, VIA: HCPCS | Performed by: INTERNAL MEDICINE

## 2022-10-06 PROCEDURE — 83605 ASSAY OF LACTIC ACID: CPT | Performed by: INTERNAL MEDICINE

## 2022-10-06 PROCEDURE — 82310 ASSAY OF CALCIUM: CPT | Performed by: INTERNAL MEDICINE

## 2022-10-06 PROCEDURE — 94003 VENT MGMT INPAT SUBQ DAY: CPT

## 2022-10-06 PROCEDURE — 36415 COLL VENOUS BLD VENIPUNCTURE: CPT | Performed by: INTERNAL MEDICINE

## 2022-10-06 PROCEDURE — 200N000001 HC R&B ICU

## 2022-10-06 PROCEDURE — 36600 WITHDRAWAL OF ARTERIAL BLOOD: CPT

## 2022-10-06 PROCEDURE — 85027 COMPLETE CBC AUTOMATED: CPT | Performed by: INTERNAL MEDICINE

## 2022-10-06 PROCEDURE — 99233 SBSQ HOSP IP/OBS HIGH 50: CPT | Performed by: INTERNAL MEDICINE

## 2022-10-06 PROCEDURE — 250N000011 HC RX IP 250 OP 636: Performed by: INTERNAL MEDICINE

## 2022-10-06 PROCEDURE — 5A1945Z RESPIRATORY VENTILATION, 24-96 CONSECUTIVE HOURS: ICD-10-PCS | Performed by: STUDENT IN AN ORGANIZED HEALTH CARE EDUCATION/TRAINING PROGRAM

## 2022-10-06 PROCEDURE — 250N000009 HC RX 250: Performed by: INTERNAL MEDICINE

## 2022-10-06 PROCEDURE — 82805 BLOOD GASES W/O2 SATURATION: CPT | Performed by: INTERNAL MEDICINE

## 2022-10-06 RX ORDER — LANOLIN ALCOHOL/MO/W.PET/CERES
6 CREAM (GRAM) TOPICAL AT BEDTIME
COMMUNITY
Start: 2022-03-21

## 2022-10-06 RX ORDER — FENTANYL CITRATE 50 UG/ML
50 INJECTION, SOLUTION INTRAMUSCULAR; INTRAVENOUS
Status: DISCONTINUED | OUTPATIENT
Start: 2022-10-06 | End: 2022-10-06

## 2022-10-06 RX ORDER — HYDROXYZINE PAMOATE 50 MG/1
1 CAPSULE ORAL EVERY 6 HOURS PRN
COMMUNITY
Start: 2022-07-21

## 2022-10-06 RX ORDER — ESCITALOPRAM OXALATE 20 MG/1
20 TABLET ORAL DAILY
COMMUNITY
Start: 2022-07-21

## 2022-10-06 RX ORDER — ALBUTEROL SULFATE 0.83 MG/ML
2.5 SOLUTION RESPIRATORY (INHALATION) EVERY 4 HOURS PRN
Status: DISCONTINUED | OUTPATIENT
Start: 2022-10-06 | End: 2022-10-08 | Stop reason: HOSPADM

## 2022-10-06 RX ADMIN — DEXTROSE AND SODIUM CHLORIDE: 5; 900 INJECTION, SOLUTION INTRAVENOUS at 09:47

## 2022-10-06 RX ADMIN — CHLORHEXIDINE GLUCONATE ORAL RINSE 15 ML: 1.2 SOLUTION DENTAL at 08:09

## 2022-10-06 RX ADMIN — ALBUTEROL SULFATE 2.5 MG: 2.5 SOLUTION RESPIRATORY (INHALATION) at 03:33

## 2022-10-06 RX ADMIN — ALBUTEROL SULFATE 2.5 MG: 2.5 SOLUTION RESPIRATORY (INHALATION) at 11:04

## 2022-10-06 RX ADMIN — AMPICILLIN SODIUM AND SULBACTAM SODIUM 3 G: 2; 1 INJECTION, POWDER, FOR SOLUTION INTRAMUSCULAR; INTRAVENOUS at 20:01

## 2022-10-06 RX ADMIN — PROPOFOL 10 MCG/KG/MIN: 10 INJECTION, EMULSION INTRAVENOUS at 05:31

## 2022-10-06 RX ADMIN — AMPICILLIN SODIUM AND SULBACTAM SODIUM 3 G: 2; 1 INJECTION, POWDER, FOR SOLUTION INTRAMUSCULAR; INTRAVENOUS at 07:46

## 2022-10-06 RX ADMIN — FENTANYL CITRATE 50 MCG: 50 INJECTION, SOLUTION INTRAMUSCULAR; INTRAVENOUS at 03:05

## 2022-10-06 RX ADMIN — AMPICILLIN SODIUM AND SULBACTAM SODIUM 3 G: 2; 1 INJECTION, POWDER, FOR SOLUTION INTRAMUSCULAR; INTRAVENOUS at 14:03

## 2022-10-06 RX ADMIN — FENTANYL CITRATE 50 MCG: 50 INJECTION, SOLUTION INTRAMUSCULAR; INTRAVENOUS at 05:31

## 2022-10-06 RX ADMIN — PANTOPRAZOLE SODIUM 40 MG: 40 INJECTION, POWDER, FOR SOLUTION INTRAVENOUS at 21:01

## 2022-10-06 RX ADMIN — ALBUTEROL SULFATE 2.5 MG: 2.5 SOLUTION RESPIRATORY (INHALATION) at 07:39

## 2022-10-06 RX ADMIN — SODIUM CHLORIDE 1000 ML: 9 INJECTION, SOLUTION INTRAVENOUS at 00:46

## 2022-10-06 ASSESSMENT — ACTIVITIES OF DAILY LIVING (ADL)
ADLS_ACUITY_SCORE: 25
ADLS_ACUITY_SCORE: 39
ADLS_ACUITY_SCORE: 42
ADLS_ACUITY_SCORE: 25
ADLS_ACUITY_SCORE: 25
ADLS_ACUITY_SCORE: 42
ADLS_ACUITY_SCORE: 43
ADLS_ACUITY_SCORE: 43
ADLS_ACUITY_SCORE: 39
ADLS_ACUITY_SCORE: 25
ADLS_ACUITY_SCORE: 39
ADLS_ACUITY_SCORE: 39

## 2022-10-06 NOTE — ED NOTES
Face to face report given with opportunity to observe patient.    Report given to LUKASZ Perry.    Niall Gross RN   10/5/2022  7:33 PM

## 2022-10-06 NOTE — PROGRESS NOTES
Pt suctioned for small amount of clear secretions orally, small amount of yellow secretions via ETT. Pt extubated without difficulty. Placed on 2 lpm nc Spo2 95%

## 2022-10-06 NOTE — PLAN OF CARE
SEDATION VACATION     Patient on ventilator with sedation.     Is Patient a candidate for sedation vacation Yes     Daily sedation interruption completed. Yes   Time of sedation interruption 10:18, extubated at 11:15 and placed on 2 LPM NC.   How patient tolerated interruption: No change in VS, pt stable during sedation vacation.     Pt is following commands, VSS, and states he does not have any thoughts of harming himself or others at this time. Pt voices he will be cooperative with staff and care.

## 2022-10-06 NOTE — PLAN OF CARE
"ER brought patient up and stated that patient's wife (soon to be ex wife as stated by wife) was here in our visitor waiting room with her father. They had also been down in ER with patient. Writer conversing with wife Karin and her father. Wife Karin told writer \"I go by his wife, but we are in the process of getting .\" She was asking many questions relating to patient but writer did not disclose much information because she was concerned if it was okay to share information or not being the patient is sedated/intubated since admission. Wife Karin then informed writer that she has a restraining order on patient but that they do text and last texted on Sunday. After that comment writer approached charge nurse and supervisor regarding concerns with wife being here if she has a restraining order on patient. PCS contacted admin and they said they would deal with it in the morning. Karin is the only one listed on patient's facesheet, but again patient unable to verbalize if that is still his POA or not.     Writer also shared that patient was living with his brother Balta and Balta's wife. Karin was aware Balta was asked to leave the ER earlier in the evening but stated she knows Balta just wants to see that his brother is okay since the scene was so traumatic. Karin also stated she does not speak with patient's mom so that they both need to be here at different times.   "

## 2022-10-06 NOTE — PROGRESS NOTES
Patient had a sedation vacation was alert appropriate following commands.  The weaning trial was quite good good volumes heart rate blood pressure sats fine was extubated without difficulty.  We will start sips of clear liquids advance as tolerated.

## 2022-10-06 NOTE — PLAN OF CARE
Pt intubated at the start of the shift and on propofol. VS were stable and tolerating ventilator well.     10:18 started sedation vacation, titrated down on propofol. Pt was following commands appropriately and tolerating sedation vacation.     11:15 Extubated with RN, RT, and Provider at bedside. Able to cough and take deep breaths. OG also removed with ET tube.     SpO2 in 92-98% on RA while awake, on 2 LPM SpO2 now as pt SpO2 decreases to 88% while sleeping. Pt is A&O, behavior appropriate to situation, and neuros intact. C/o blurry vision earlier in the shift that the pt states is clearing now. Lungs are clear in the uppers, diminished in the bases with fine crackles noted posteriorly. Encouraged to cough and deep breathe. HR SR/ST 90-110s. Bowels active and denies nausea. Zuluaga removed at 13:30, pt was able to void 200 at 18:15 see flowsheets. Scattered generalized rash present. Rest of assessments as charted. Ivs in R and L saline locked at this time. , 103 and 74. Advanced to a regular diet, tolerating well. Swallowing without difficulty, is experiencing a mild sore throat. Up to the chair as and assist of x1 with gait belt. Family at bedside for part of the shift, mother updated over the phone. Remains free of injury, alarms on, and call light within reach. Face to face report given with opportunity to observe patient.    Report given to Tracy Kenyon RN   10/6/2022  7:00 PM

## 2022-10-06 NOTE — PROGRESS NOTES
Care Transitions focused note:      Met with Lino, his wife, Karin and father in law, Todd.  Lino and Karin shared some of his substance use history.  Lino has been utilizing meth off and on since he was a child.  Last stay in treatment was October 2020-October 2021.  Relapsed at the beginning of the year.  Lino indicates that he wants to go through treatment again.  Writer will work on coordinating a Rule 25 to be completed.

## 2022-10-06 NOTE — PLAN OF CARE
Patient's brother Balta called for an update. Writer went into patient's room and asked if she could give brother an update. He nodded his head yes. Writer just gave him a brief update on patient condition at this time.

## 2022-10-06 NOTE — PROGRESS NOTES
Chart reviewed and case discussed with bedside nurse.  In summary 41-year-old gentleman with past medical history of substance abuse, anxiety and depressive disorder, and early childhood leukemia presents to the ICU with altered mental status after being found down.  He was noted to have episodes of unresponsiveness and combativeness and he was intubated in the field for altered mental status.  He presented to the hospital with blood in his oropharynx and no other signs of injury.  I have reviewed the patient's head CT and cervical imaging and see no acute pathology.  His CT chest does show some right lung consolidations concerning for aspiration.  He is maintained last night on full ventilatory support with minimal oxygen requirements.  /75 (BP Location: Right arm)   Pulse 109   Temp 98.8  F (37.1  C) (Tympanic)   Resp 13   Wt 101 kg (222 lb 10.6 oz)   SpO2 96%   BMI 28.52 kg/m     Vent Mode: CMV/AC  (Continuous Mandatory Ventilation/ Assist Control)  FiO2 (%): 30 %  Resp Rate (Set): 14 breaths/min  Tidal Volume (Set, mL): 600 mL  PEEP (cm H2O): 8 cmH2O  Inspiratory Pressure (cm H2O) (Drager Arlene): 19  Resp: 13  His sedation regiment consist of propofol and sedation and he remained hemodynamically stable overnight.  I have reviewed the morning labs and find nothing remarkable, his lactate is within normal limits and while he has a mild anemia there is no signs of active bleeding.  Since patient is on minimal oxygen requirements would recommend trying to lighten sedation and wean the patient off mechanical ventilation to see if he is a candidate for extubation today.  He may benefit from dexmedetomidine not to facilitate his weaning.  Once the patient is more awake and if his mental status is appropriate would recommend trying to clear his neck and removing his c-collar.  At this time would recommend a repeat survey to make sure there is no missed injuries.  Continue with aggressive bronchial hygiene  given concern for possible aspiration.  Of note his CKs are elevated would recommend following CKs given concern that he was found down for rhabdomyolysis.  Continue to ensure brisk urine output and fluid resuscitation.  Creatinine remains within normal limits.  We will continue to follow the patient from the telemetry center and if any questions or concerns arise do not hesitate to reach out and call us.    Nelson Victor MD  Critical Care Medicine

## 2022-10-06 NOTE — CARE PLAN
Prior to Admission Medication Reconciliation preparation:   (medlist prepared for review only- have not spoken with patient)    Medications added:   [] None  [] Cleared med list from historical, outdated medications and re-entered current medications  [x] As listed below:    Melatonin    hydroxyzine    Medications deleted:   [] None  [x] Cleared entire med list from historical, outdated medications   [x] As listed below:    Lithium, prednisone, omeprazole, diclofenac (ordered- no recent rx history, not on most recent admission med list)    Changes made to existing medications:   [] None  [] Updated strengths and frequencies to most current  [x] As listed below:    lexapro dosing to most current: fill dates reflect non-compliancy    Allergies listed at another location:  [x]Allergies match allergies listed in Epic: NKDA  []Other allergies listed and added to chart:    Medication reconciliation sources:   []Medlist from primary provider outside of [facility]:  [x][facility] Report Review: 2009  [x]Epic Chart Review  [x]Care Everywhere review;  Medication Sig Start Date   melatonin 3 mg tablet    Indications: Methamphetamine use disorder, moderate (HC), Personality disorder (HC), MDD (major depressive disorder), recurrent severe, 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 03/21/2022   escitalopram oxalate (LEXAPRO) 20 mg tablet    Indications: KADE (generalized anxiety disorder), Severe episode of recurrent major depressive disorder, without psychotic features (HC) Take 1 Tablet (20 mg) by mouth every morning. 07/21/2022   hydrOXYzine pamoate (VISTARIL) 50 mg capsule    Indications: KADE (generalized anxiety disorder)  Take 1 Capsule (50 mg) by mouth every 6 hours if needed for Anxiety. 07/21/2022     []Pharmacy med list: **  []Pharmacy phone call  [x]Outside meds dispense report: see below  []Nursing home or Assisted Living MAR:  []Other: **    Is patient on coumadin?  [x]No    Pharmacy patient regularly uses: see below    Approximate time frame PTA medications will be reviewed with patient or managing party:    [x]Patient is intubated and unable to participate in review. PTA meds updated as much as possible given the available sources at this time. Will attempt review when pt is extubated if possible.     Pamela Adilene on 10/6/2022 at 7:48 AM      Medication Dispense History (from 10/6/2021 to 10/5/2022)  Expand All  Collapse All    Amoxicillin     Dispensed Days Supply Quantity Provider Pharmacy   Amoxicillin 875 Mg  Tab Auro 12/10/2021 10 20 each SOCORRO AHUMADA [facility] #[id number] - N...        Escitalopram Oxalate     Dispensed Days Supply Quantity Provider Pharmacy   ESCITALOPRAM OXALATE 20 MG TABLET 07/21/2022 30 30 tablet OPAL DAVIS [address]   ESCITALOPRAM OXALATE 20 MG TABLET 02/25/2022 90 90 tablet Labreche, Karalee A, [facility] [address]   ESCITALOPRAM OXALATE 10 MG TABS 11/15/2021 30 30 tablet MORALES,[facility] [place] ...   ESCITALOPRAM OXALATE 5 MG TABS 11/15/2021 30 30 tablet MORALES,[facility] [place] ...   ESCITALOPRAM OXALATE 10 MG TABS 10/18/2021 30 30 tablet FONTANEZ,[facility] ...   ESCITALOPRAM OXALATE 5 MG TABS 10/18/2021 30 30 tablet [facility] ...        Omeprazole     Dispensed Days Supply Quantity Provider Pharmacy   Omeprazole Dr 40 Mg Cap  12/28/2021 30 30 each HALINA BAEZ [facility] #[id number] - N...        hydrOXYzine Pamoate     Dispensed Days Supply Quantity Provider Pharmacy   HYDROXYZINE PAMOATE 50 MG CAPSULE 07/21/2022 4 15 capsule [facility] [address]   HYDROXYZINE PAMOATE 50 MG  CAPSULE 03/21/2022 30 90 capsule [facility]...        predniSONE     Dispensed Days Supply Quantity Provider Pharmacy   Prednisone 20 Mg    Tab Bison 12/28/2021 12 20 each HALINA BAEZ [facility] #[id number] - N...

## 2022-10-06 NOTE — H&P
"[facility]    History and Physical - Hospitalist Service       Date of Admission:  10/5/2022    Assessment & Plan      Lino Nava is a 41 year old male admitted on 10/5/2022. He was brought to eD by EMS intubated after after found down in his living environment. Brought to ED intubated with ED collar. ED w/u negative for Intracranial pathology.  ED work-up revealed large amount of dried blood in his nose and oral cavity, CT chest revealed diffuse patchy consolidation of the right lung all lobes.  Family arrived and provided additional information which consists of depression, anxiety and drug abuse.  Will be admitted to ICU.    Hospital problems  1.  Acute respiratory failure with hypoxia.  He is intubated and being ventilated. Propofol for sedation, \"easy\" to ventilate  - ventilator bundles     2. Possible aspiration pneumonitis, vs blood aspiration.   - will monitor with daily XR  - ED gave antibiotics. Will hold antibiotics  - will use aggressive pulmonary toile - nebs and suctioning R lung each time nebs given    3. Epistaxis. Could be due to trauma (not witnessed).  - monitor.   - no facial bone fractures    4. Amphetamine/methamphetamine use.   - long lasting problem  - consider referral when extubated    5. KADE. Present on admission. Could be a problem after he is extubated  - consider behavioral health consult to prevent post extubation behavioral problems.     Diet:   NPO. He is intubated.  DVT Prophylaxis: Enoxaparin (Lovenox) SQ  Zuluaga Catheter: PRESENT, indication:    Central Lines: None  Cardiac Monitoring: None  Code Status:   Full    Clinically Significant Risk Factors Present on Admission          # Hypocalcemia: Ca = 8.4 mg/dL (Ref range: 8.5 - 10.1 mg/dL) and/or iCa = N/A on admission, will replace as needed                 Disposition Plan Home when stable. Expect more than 2 midnights stay     Expected Discharge Date: 10/07/2022           To be determined.      The patient's " care was discussed with the on call team, mother and wife Team.    Sabi Payne MD  Hospitalist Service  [facility]  Securely message with the [employer] Web Console (learn more here)  Text page via [facility] Paging/Directory         ______________________________________________________________________    Chief Complaint   Unable to obtain.  Patient is intubated and sedated.    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  When EMS arrived they found the patient down and when they tried to interact with him he was alternatingly unresponsive and combative.  Was given 5 mg Versed and they were able to get vitals and noted his oxygenation was 30%.  He was given Narcan 2 mg x 4 for total 8 mg without any improvement.  He was given ketamine prior to intubation and intubated on the scene.  Proved to emergency room already intubated with a c-collar, dry blood in his face nose and EMS reported dry blood in his oropharynx.  Appropriate and timely ED work-up revealed no intracranial hemorrhage, no C-spine fracture.  CT chest revealed diffuse right lung consolidations and scattered multifocal pulmonary opacities, likely represent infection, less likely aspiration inhalation injury.  No aortic dissection, stenosis, or acute abnormality.  Mild proximal aortic dilatation up to 4.2 cm.  Soft tissue density related to the right inguinal canal likely represents and ascended right testicle.  He was given  propofol and Versed for sedation.  He remained hemodynamically stable.  Other ED work-up tests revealed CK6 7 6, lactic acid 2 repeated 2.1.   and troponin 27.  His CBC unremarkable but white blood cell count is 2.7.  Urinalysis revealed no inflammatory changes.  Acetaminophen level less than 2, alcohol undetected lithium less than 0.2 and salicylate level less than 2.  Urine tested for illicit drugs he was tested positive for cannabinoids, methamphetamine and amphetamine.  Patient was seen in the emergency room.  Dilemma was to admit here or to look for transfer.  My concern was that being hypoxic with saturation 30% if it is accurate code courses anoxic brain injury.  After examining him he was able to move his lower extremities to painful stimuli and the pupil reaction also showed that brain function is intact at least from my initial exam.  We also consulted pulmonologist at [facility] who recommended aggressive pulmonary toilet which should include scheduled albuterol and suctioning after albuterol procedure.  He was admitted to ICU.  BG showed improvement.  Around 1145 patient became diaphoretic and blood pressure became soft.  He was given bolus.  He is currently on propofol drip and Versed.  We will continue propofol and try to change Versed drip to Versed bolus.  We will hold antibiotics.  For low blood pressure I will order 1 L and will close monitor him in ICU.  Review of Systems    Patient is intubated.  Unable to obtain.    Past Medical History    I have reviewed this patient's medical history and updated it with pertinent information if needed.   Past Medical History:   Diagnosis Date     KADE (generalized anxiety disorder)      History of leukemia     as a child     History of substance abuse (H)     meth -- went thru tx     Mild major depression (H)      Obesity (BMI 30.0-34.9)      Olecranon bursitis 07/26/2020       Past Surgical History   I have reviewed this patient's surgical history and  updated it with pertinent information if needed.  Unable to obtain.  He is intubated.    Social History   I have reviewed this patient's social history and updated it with pertinent information if needed.  Social History     Tobacco Use     Smoking status: Former Smoker     Smokeless tobacco: Never Used   Substance Use Topics     Alcohol use: Not Currently     Drug use: No       Family History     Negative for depression or suicidal ideation.    Prior to Admission Medications   Prior to Admission Medications   Prescriptions Last Dose Informant Patient Reported? Taking?   diclofenac (VOLTAREN) 75 MG EC tablet   No No   Sig: Take 1 tablet (75 mg) by mouth 2 times daily with food as needed for left olecranon bursitis   Patient not taking: Reported on 8/27/2021   escitalopram (LEXAPRO) 10 MG tablet   No No   Sig: Take 1 tablet (10 mg) by mouth daily Take with 5 mg tablet to equal 15 mg total   escitalopram (LEXAPRO) 5 MG tablet   No No   Sig: Take 1 tablet (5 mg) by mouth daily Take with 10 mg tablet to equal 15 mg total.   lithium 300 MG capsule   Yes No   Sig: Take 300 mg by mouth 2 times daily (with meals)   Patient not taking: Reported on 8/27/2021   omeprazole (PRILOSEC) 40 MG DR capsule   No No   Sig: Take 1 capsule (40 mg) by mouth daily   predniSONE (DELTASONE) 20 MG tablet   No No   Sig: Take 60 mg daily for 3 days, 40 mg daily for 3 days, 20 mg daily for 3 days, 10 mg daily for 3 days.      Facility-Administered Medications: None     Allergies   No Known Allergies    Physical Exam   Vital Signs: Temp: 97.1  F (36.2  C) Temp src: Tympanic BP: 135/99 Pulse: 111   Resp: 20 SpO2: 100 % O2 Device: Mechanical Ventilator Oxygen Delivery: 10 LPM  Weight: 226 lbs 3.07 oz    Physical exam:  General: He is intubated.  Not fighting the ventilator.  Dry blood in his nose around his mouth.  Neck: Supple  Cardiac: Regular rhythm and rate mildly tachycardic no S3.  No rubs.  Pulmonary: Right side has rhonchi.  Left is  clear.  Abdomen: Soft nontender nondistended no pulsatile masses.  : Zuluaga catheter in place.  Musculoskeletal: There is no pressure sores neuro no bruises.  No major joint deformities or effusion.  Skin: Diaphoretic.  Lymphatic/hematologic.  There is no lymphadenopathy.  No rash or ecchymosis.    Data   Data reviewed today: I reviewed all medications, new labs and imaging results over the last 24 hours. I personally reviewed the EKG tracing showing NSR, tachycardic, no ST-T changes.    Recent Labs   Lab 10/05/22  2217 10/05/22  1538   WBC  --  2.7*   HGB  --  15.8   MCV  --  90   PLT  --  233   INR  --  0.95   NA  --  141   POTASSIUM  --  4.2   CHLORIDE  --  108   CO2  --  28   BUN  --  29   CR  --  0.86   ANIONGAP  --  5   ALEJANDRO  --  8.4*   GLC 98 103*   ALBUMIN  --  3.4   PROTTOTAL  --  7.0   BILITOTAL  --  0.5   ALKPHOS  --  80   ALT  --  27   AST  --  23     2.7 (L)    \    15.8    /    233   N 88    L N/A    141    108    29 /   ------------------------------------ 98   ALT 27   AST 23   AP 80   ALB 3.4   Ca 8.4 (L)  4.2    28    0.86 \    % RETIC N/A    LDH N/A  Troponin N/A    BNP N/A     (H)  INR 0.95   PTT N/A    D-dimer N/A    Fibrinogen N/A    Antithrombin N/A  Ferritin N/A  CRP N/A    IL-6 N/A  Recent Results (from the past 24 hour(s))   XR Chest Port 1 View    Narrative    PROCEDURE:  XR CHEST PORT 1 VIEW    HISTORY: intubation    COMPARISON:  None.    FINDINGS: Single view chest radiograph  Endotracheal tube tip projects over the upper thoracic trachea,  approximately 5.1 cm from the keri.  Cardiomediastinal silhouette is  within normal limits. Patchy multifocal round glass and consolidative  opacities in the right lung. Pleural spaces are clear. No  subdiaphragmatic free air.      Impression    IMPRESSION:   1.  Endotracheal tube tip is within normal limits and projects the  upper thoracic trachea, approximately 5.1 cm from the keri.   2.  Multifocal right lung patchy groundglass and  consolidative  opacities, concerning for infection, less likely inhalational injury.    DARSHAN SWANN MD         SYSTEM ID:  [id number]   CT Head w/o Contrast    Narrative    EXAM: CT HEAD W/O CONTRAST 10/5/2022 4:35 PM    PROVIDED HISTORY: AMS    COMPARISON: None    TECHNIQUE:   Imaging protocol: Multiplanar CT images of the head without  intravenous contrast.   Acquisition: This CT exam was performed using one or more the  following dose reduction techniques: automated exposure control,  adjustment of the mA and/or kV according to patient size, and/or  iterative reconstruction technique.    FINDINGS:  No intracranial hemorrhage, mass effect, or midline shift. The  ventricles are proportionate to the cerebral sulci. Normal variant  mary lou cisterna magna. No acute loss of gray-white matter  differentiation.    No acute osseous abnormality. Mild paranasal sinus mucosal thickening.  Left greater than right mastoid effusions. The orbits are grossly  unremarkable.       Impression    IMPRESSION:  No acute intracranial abnormality.    DARSHAN SWANN MD         SYSTEM ID:  [id number]   CT Cervical Spine w/o Contrast    Narrative    EXAM: CT CERVICAL SPINE W/O CONTRAST 10/5/2022 4:37 PM    PROVIDED HISTORY: AMS    COMPARISON: None    TECHNIQUE:   Imaging protocol: Using multidetector thin collimation helical  acquisition technique, axial, coronal and sagittal CT images through  the cervical spine were obtained without intravenous contrast.   Acquisition: This CT exam was performed using one or more the  following dose reduction techniques: automated exposure control,  adjustment of the mA and/or kV according to patient size, and/or  iterative reconstruction technique.    FINDINGS:  The cervical vertebrae are normally aligned. Normal lordotic curvature  of the cervical spine. No findings of acute fracture or traumatic  subluxation. No prevertebral edema. There is no disc height narrowing  at any level. Mild uncinate and facet  hypertrophy on the right at C3-4  resulting in moderate right foraminal narrowing, otherwise no  significant foraminal narrowing. No significant spinal canal narrowing  at any level.      No acute abnormality of the paraspinous soft tissues. Multifocal  patchy consolidative right upper lung opacities.      Impression    IMPRESSION:   1.  No acute fracture or traumatic subluxation.  2.  Multifocal patchy ground glass and consolidative opacities in  right upper lung, concerning for infection.    DARSHAN SWANN MD         SYSTEM ID:  [id number]   CTA Chest Abdomen Pelvis w Contrast    Narrative    EXAMINATION: CTA CHEST ABDOMEN PELVIS W CONTRAST, 10/5/2022 4:37 PM    COMPARISON: Chest radiograph 10/5/2022    HISTORY: AMS evidence of trauma, intubated R/O Dissection    TECHNIQUE:  Imaging protocol: Helical post-contrast CT images of chest, abdomen  and pelvis with reconstructions in 3 planes for angiographic  evaluation. Meds given: Isovue 370 90ml Given.  Acquisition: This CT exam was performed using one or more the  following dose reduction techniques: automated exposure control,  adjustment of the mA and/or kV according to patient size, and/or  iterative reconstruction technique.  Processing: 3D rendering on independent workstation using Maximum  Intensity Projection (MIP) was performed and archived to PACS. 3D  reconstructions are interpreted and reported by supervising  radiologist.    FINDINGS:    CHEST:  LUNG: Diffuse patchy consolidation of the right lower lobe and to a  lesser degree the right upper and right middle lobe. Additional  scattered patchy ground glass consolidative opacities in the right  lung to a lesser degree the left lung.  AIRWAYS: Endotracheal tube terminates the upper thoracic trachea.  Central airways are patent.  PLEURA: No pleural effusion or pneumothorax.  VESSELS: Patent major thoracic vasculature. Ascending aorta is mildly  dilated proximally and measures 4.2 at the proximal arch,  no  dilatation of the distal arch or descending aorta. No aortic  dissection, stenosis, or acute abnormality.  HEART: Within normal limits.  LYMPH NODES: Enlarged mediastinal and right greater than left hilar  lymph nodes  THYROID: No thyroid nodules.    ABDOMEN/PELVIS:  LIVER: Normal contour. No suspicious liver lesions. Fatty infiltration  along the falciform ligament.  GALLBLADDER: Within normal limits.  BILE DUCTS: No biliary tract dilatation.  PANCREAS: Within normal limits.  SPLEEN: No splenomegaly. Left upper quadrant splenules.  ADRENALS: Within normal limits.  KIDNEYS/URETERS: Within normal limits.  URINARY BLADDER: Within normal limits.  REPRODUCTIVE ORGANS: Soft tissue density in the region of the right  inguinal canal measuring 4.2 x 2.1 cm, suspicious for undescended  testicle.    BOWEL: No bowel dilatation or wall thickening. Normal appendix  PERITONEUM/RETROPERITONEUM: No free fluid or free air.  VESSELS: Patent major abdominal vasculature. Aorta appears within  normal limits and demonstrates no dissection, stenosis, or acute  abnormality.  LYMPH NODES: There are no pathologically enlarged lymph nodes.    BONES AND SOFT TISSUES:  No suspicious osseous lesions. Degenerative periarticular changes and  spinal spondylosis.      Impression    IMPRESSION:  1.  Diffuse right lung consolidations and scattered multifocal  pulmonary opacities, likely represent infection, less likely  aspiration, inhalational injury.  2.  No aortic dissection, stenosis, or acute abnormality. Mild  proximal aortic dilatation up to 4.2 cm.  3.  Soft tissue density related to the right inguinal canal, likely  represents undescended right testicle.    DARSHAN SWANN MD         SYSTEM ID:  [id number]   CT Facial Bones without Contrast    Narrative    CT FACIAL BONES WITHOUT CONTRAST    HISTORY: 41 years Male trauma intubated    COMPARISON: None    TECHNIQUE: Axial CT imaging of the facial bones was performed. Coronal  and sagittal  reconstructions were obtained.    FINDINGS: An endotracheal tube is present. An oral gastric tube is  present. There is also thickening within the ethmoid air cells and to  a lesser extent the sinuses. There is no evidence of facial fracture.  The orbits are intact.      Impression    IMPRESSION: No evidence of facial fracture.    YOON TAYLOR MD         SYSTEM ID:  [id number]

## 2022-10-06 NOTE — PROVIDER NOTIFICATION
Writer notified HCP of patient's BP becoming hypotensive (See flowsheet) MAP 60-65 and 's-120's. Patient extremely diaphoretic. Afebrile. Temp 96.7. Patient started to move legs and turned his head, opening his eyes once. Clear fluid came from his right ear. HCP to bedside assessing patient. HCP to put orders in.

## 2022-10-06 NOTE — PHARMACY
Pharmacy Antimicrobial Stewardship Assessment     Current Antimicrobial Therapy:  Anti-infectives (From now, onward)    Start     Dose/Rate Route Frequency Ordered Stop    10/06/22 0730  ampicillin-sulbactam (UNASYN) 3 g in sodium chloride 0.9 % 100 mL intermittent infusion         3 g  200-400 mL/hr over 15-30 Minutes Intravenous EVERY 6 HOURS 10/06/22 0647          Indication: aspiration pneumonia    Days of Therapy: 2     Pertinent Labs:  Recent Labs   Lab Test 10/06/22  0602 10/05/22  1538 18  0708   WBC 7.5 2.7* 4.3     Recent Labs   Lab Test 10/06/22  0600 10/06/22  0442 10/05/22  2139   LACT 1.3  --  2.1*   CRP  --  159.0*  --    PCAL  --  4.73*  --         Temperature:  Temp (24hrs), Av.6  F (37  C), Min:97.1  F (36.2  C), Max:99.9  F (37.7  C)    Culture Results:   10/5/22: COVID = negative    Recommendations/Interventions:  None at this time    Shelli Sahu, [facility]  2022

## 2022-10-06 NOTE — ED NOTES
C collar applied due to unknown event leading to patient being unresponsive and presence of a large amount of dried blood around nares and mouth.

## 2022-10-06 NOTE — PLAN OF CARE
Reason for Restraint being discontinued: Pt following commands and extubated.   Time Restraint discontinued: 11:15  Care Plan : Yes

## 2022-10-06 NOTE — ED NOTES
Dr. Ohara informed that patient is almost maxed out on Versed drip and continues to try to lift arms, pull knees up, and turn head. Dr. Ohara states he will order a propofol drip to be started if Versed is maxed out and patient is continuing to move.

## 2022-10-06 NOTE — PROVIDER NOTIFICATION
Notified HCP of patient's red rash sporadically across body.     HCP came to bedside to visualize rash, no new orders placed.

## 2022-10-06 NOTE — PLAN OF CARE
Writer spoke to Niall LAL in ER at this time and received report on patient. Patient awaiting to have a facial CT done so they will bring patient up once that is completed.

## 2022-10-06 NOTE — ED NOTES
Dr. Ohara informed that patient has been moving around quite a bit and pulling legs up and versed drip is not working enough to keep patient sedated to go for CT's. Dr. Ohara states he will order dose of ketamine and states to increase versed as needed to max dose of 8 mg.

## 2022-10-06 NOTE — PROGRESS NOTES
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  When EMS arrived they found the patient down and when they tried to interact with him he was alternatingly unresponsive and combative.  Was given 5 mg Versed and they were able to get vitals and noted his oxygenation was 30%.  He was given Narcan 2 mg x 4 for total 8 mg without any improvement.  He was given ketamine prior to intubation and intubated on the scene.  Proved to emergency room already intubated with a c-collar, dry blood in his face nose and EMS reported dry blood in his oropharynx.  Appropriate and timely ED work-up revealed no intracranial hemorrhage, no C-spine fracture.  CT chest revealed diffuse right lung consolidations and scattered multifocal pulmonary opacities, likely represent infection, less likely aspiration inhalation injury.  No aortic dissection, stenosis, or acute abnormality.  Mild proximal aortic dilatation up to 4.2 cm.  Soft tissue density related to the right inguinal canal likely represents and ascended right testicle.  He was given propofol and Versed for sedation.  He remained hemodynamically stable.  Other ED work-up tests revealed CK6 7 6, lactic acid 2 repeated 2.1.   and troponin 27.  His CBC unremarkable but white blood cell count is 2.7.  Urinalysis revealed no inflammatory changes.  Acetaminophen level less than 2, alcohol undetected lithium less than 0.2 and salicylate level less than 2.  Urine tested for illicit drugs he was tested positive for cannabinoids, methamphetamine and  "amphetamine.  Patient was seen in the emergency room.  Dilemma was to admit here or to look for transfer.  My concern was that being hypoxic with saturation 30% if it is accurate code courses anoxic brain injury.  After examining him he was able to move his lower extremities to painful stimuli and the pupil reaction also showed that brain function is intact at least from my initial exam.  We also consulted pulmonologist at [facility] who recommended aggressive pulmonary toilet which should include scheduled albuterol and suctioning after albuterol procedure.  He was admitted to ICU.  BG showed improvement.  Around 1145 patient became diaphoretic and blood pressure became soft.  He was given bolus.  He is currently on propofol drip and Versed.  We will continue propofol and try to change Versed drip to Versed bolus.  We will hold antibiotics.  For low blood pressure I will order 1 L and will close monitor him in ICU    Assessment & Plan       1.  Acute respiratory failure with hypoxia.  He is intubated and being ventilated. Propofol for sedation, \"easy\" to ventilate  - ventilator bundles   -10/6: Patient remains intubated.  Peak airway pressures are low, tidal volumes good.  He is on 30% FiO2.  Is on propofol only for sedation.  Arterial blood gas   great patient 7.40 PCO2 42 PO2 79 bicarb 26.  He does have fairly significant infiltrate on the right side due to presumed aspiration.  Will continue to use hopefully only propofol for sedation.  Do weaning parameters weaning trials sedation vacation and see how he does.  He does awaken to voice does follow commands.       2. Possible aspiration pneumonitis, vs blood aspiration.   - will monitor with daily XR  - ED gave antibiotics. Will hold antibiotics  - will use aggressive pulmonary toile - nebs and suctioning R lung each time nebs given GERD.    -Continue home medications  -10/6: No fevers procalcitonin 4.73 CRP was 159.  Decided to restart the antibiotics we will " continue with IV Zosyn.     3. Epistaxis. Could be due to trauma (not witnessed).  - monitor.   - no facial bone fractures  -10/6: No evidence of ongoing bleeding.  Hemoglobin is stable at this time.     4. Amphetamine/methamphetamine use.   - long lasting problem  - consider referral when extubated  -10/6: This is a long-term issue problem.  Patient is up here visiting family/living with family.  Will address whether or not interested in treatment when extubated     5. KADE. Present on admission. Could be a problem after he is extubated  - consider behavioral health consult to prevent post extubation behavioral problems    6.  Elevated CK: Went up to 1400.  Still minimal we will repeat level later this morning.  Likely due to his previous agitation.  Urine output is good.    7.  Cardiac: No dysrhythmias at all hemodynamically stable currently.  Did receive IV fluids.  Will bolus if necessary.       8.  Renal: Zuluaga in place urine output is adequate.  Renal function normal.  Electrolytes within acceptable range also.       Diet: NPO for Medical/Clinical Reasons Except for: No Exceptions  Fluids: D5 normal saline 100 cc an hour    DVT Prophylaxis: Pneumatic Compression Devices  Code Status: Full Code    Disposition: Expected discharge in 2-3 days once extubated and aspiration pneumonia adequately treated.    Ady Kerr MD    Interval History   Patient remains intubated.  He is on minimal amounts of propofol only 20 right now he does awaken to minimal touch and does follow simple commands wiggling toes squeezing hands.  Will little later this morning try 6 sedation vacation to some weaning parameters and see how he does I think it should not be a big problem getting him off the ventilator.  We will just have to make sure that he is alert enough.  Otherwise hemodynamically stable.  I put him back on the Unasyn given his elevated procalcitonin and CRP.    -Data reviewed today: I reviewed all new labs and imaging  results over the last 24 hours. I personally reviewed no images or EKG's today.    Physical Exam   Temp: 99.9  F (37.7  C) Temp src: Tympanic BP: 125/75 Pulse: 110   Resp: (!) 11 SpO2: 96 % O2 Device: Mechanical Ventilator Oxygen Delivery: 10 LPM  Vitals:    10/05/22 1515 10/05/22 2123 10/06/22 0651   Weight: 102.6 kg (226 lb 3.1 oz) 102.9 kg (226 lb 13.7 oz) 101 kg (222 lb 10.6 oz)     Vital Signs with Ranges  Temp:  [97.1  F (36.2  C)-99.9  F (37.7  C)] 99.9  F (37.7  C)  Pulse:  [] 110  Resp:  [7-26] 11  BP: ()/() 125/75  FiO2 (%):  [30 %-100 %] 30 %  SpO2:  [86 %-100 %] 96 %  I/O last 3 completed shifts:  In: 2439 [I.V.:2439]  Out: 1175 [Urine:675; Emesis/NG output:500]    Peripheral IV 10/05/22 Anterior;Distal;Right Upper arm (Active)   Site Assessment WDL 10/06/22 0500   Line Status Infusing;Checked every 1-2 hour 10/06/22 0500   Phlebitis Scale 0-->no symptoms 10/06/22 0500   Infiltration Scale 0 10/06/22 0500   Infiltration Site Treatment Method  None 10/06/22 0500   Number of days: 1       Peripheral IV 10/05/22 Left Upper forearm (Active)   Site Assessment WDL 10/06/22 0500   Line Status Infusing;Checked every 1-2 hour 10/06/22 0500   Dressing Intervention New dressing  10/05/22 1535   Phlebitis Scale 0-->no symptoms 10/06/22 0500   Infiltration Scale 0 10/06/22 0500   Infiltration Site Treatment Method  None 10/06/22 0500   Number of days: 1       ETT Oral 8 mm (Active)   Secured at (cm) 25 cm 10/06/22 0518   Measured from Lips 10/06/22 0518   Position Center 10/06/22 0000   Secured by Commercial tube chester 10/06/22 0000   Bite Block Included with Commercial tube chester 10/06/22 0000   Site Appearance Clean 10/06/22 0000   Cuff Pressure - cm H2O 25 cmH20 10/06/22 0333   Number of days: 1       NG/OG/NJ Tube Orogastric Left mouth (Active)   Site Description WDL 10/06/22 0637   Status Suction-low intermittent 10/06/22 0637   Placement Confirmation [place] unchanged 10/06/22 0637    [place] (cm marking) at nare/mouth 65 cm 10/06/22 0637   Number of days: 1       Urethral Catheter 10/05/22 Straight-tip 16 fr (Active)   Tube Description Positional 10/06/22 0523   Catheter Care Done;Catheter wipes;Soap and water/perineal cleanser 10/06/22 0523   Collection Container Standard 10/06/22 0523   Securement Method Securing device (Describe) 10/06/22 0523   Rationale for Continued Use Deep Sedation/Paralysis 10/06/22 0523   Urine Output 30 mL 10/06/22 0637   Number of days: 1     No line/device    Constitutional: Patient sedated.  Does arouse to touch and voice is in no distress he is orally intubated.       HEENT: Normocephalic/atraumatic, PERRL, EOMI, mouth moist oral ET tube in place.  I see no evidence of obvious bleeding anywhere., neck supple, no LN.     Cardiovascular: RRR.  No murmur, no  rubs, or gallops.  JVD no.  Bruits no.  Pulses 2    Respiratory: Few scattered crackles right side moving good air.  Clear to auscultation bilaterally    Abdomen: Soft, nontender, nondistended, no organomegaly. Bowel sounds present    Extremities: Warm/dry.  No edema    Neuro:   Non focal, cranial nerves intact, Moves all extremities.  Medications     dextrose 5% and 0.9% NaCl 100 mL/hr at 10/06/22 0637     propofol 20 mcg/kg/min (10/06/22 0650)       albuterol  2.5 mg Nebulization Q4H     ampicillin-sulbactam  3 g Intravenous Q6H     chlorhexidine  15 mL Mouth/Throat Q12H     [Held by provider] diclofenac  75 mg Oral BID     [Held by provider] escitalopram  10 mg Oral Daily     [Held by provider] escitalopram  5 mg Oral Daily     [Held by provider] lithium  300 mg Oral BID w/meals     pantoprazole  40 mg Intravenous Q24H     [Held by provider] predniSONE  5 mg Oral Daily       Data   Recent Labs   Lab 10/06/22  0602 10/06/22  0523 10/06/22  0442 10/06/22  0104 10/05/22  2217 10/05/22  1538   WBC 7.5  --   --   --   --  2.7*   HGB 13.3  --  13.8 13.9  --  15.8   MCV 88  --   --   --   --  90     --    --   --   --  233   INR  --   --   --   --   --  0.95   NA  --   --  142  --   --  141   POTASSIUM  --   --  4.1  --   --  4.2   CHLORIDE  --   --  113*  --   --  108   CO2  --   --  25  --   --  28   BUN  --   --  24  --   --  29   CR  --   --  0.79  --   --  0.86   ANIONGAP  --   --  4  --   --  5   ALEJANDRO  --   --  8.3*  --   --  8.4*   GLC  --  110* 145*  --  98 103*   ALBUMIN  --   --   --   --   --  3.4   PROTTOTAL  --   --   --   --   --  7.0   BILITOTAL  --   --   --   --   --  0.5   ALKPHOS  --   --   --   --   --  80   ALT  --   --   --   --   --  27   AST  --   --   --   --   --  23       Recent Results (from the past 24 hour(s))   XR Chest Port 1 View    Narrative    PROCEDURE:  XR CHEST PORT 1 VIEW    HISTORY: intubation    COMPARISON:  None.    FINDINGS: Single view chest radiograph  Endotracheal tube tip projects over the upper thoracic trachea,  approximately 5.1 cm from the keri.  Cardiomediastinal silhouette is  within normal limits. Patchy multifocal round glass and consolidative  opacities in the right lung. Pleural spaces are clear. No  subdiaphragmatic free air.      Impression    IMPRESSION:   1.  Endotracheal tube tip is within normal limits and projects the  upper thoracic trachea, approximately 5.1 cm from the keri.   2.  Multifocal right lung patchy groundglass and consolidative  opacities, concerning for infection, less likely inhalational injury.    DARSHAN SWANN MD         SYSTEM ID:  [id number]   CT Head w/o Contrast    Narrative    EXAM: CT HEAD W/O CONTRAST 10/5/2022 4:35 PM    PROVIDED HISTORY: AMS    COMPARISON: None    TECHNIQUE:   Imaging protocol: Multiplanar CT images of the head without  intravenous contrast.   Acquisition: This CT exam was performed using one or more the  following dose reduction techniques: automated exposure control,  adjustment of the mA and/or kV according to patient size, and/or  iterative reconstruction technique.    FINDINGS:  No intracranial hemorrhage,  mass effect, or midline shift. The  ventricles are proportionate to the cerebral sulci. Normal variant  mary lou cisterna magna. No acute loss of gray-white matter  differentiation.    No acute osseous abnormality. Mild paranasal sinus mucosal thickening.  Left greater than right mastoid effusions. The orbits are grossly  unremarkable.       Impression    IMPRESSION:  No acute intracranial abnormality.    DARSHAN SWANN MD         SYSTEM ID:  [id number]   CT Cervical Spine w/o Contrast    Narrative    EXAM: CT CERVICAL SPINE W/O CONTRAST 10/5/2022 4:37 PM    PROVIDED HISTORY: AMS    COMPARISON: None    TECHNIQUE:   Imaging protocol: Using multidetector thin collimation helical  acquisition technique, axial, coronal and sagittal CT images through  the cervical spine were obtained without intravenous contrast.   Acquisition: This CT exam was performed using one or more the  following dose reduction techniques: automated exposure control,  adjustment of the mA and/or kV according to patient size, and/or  iterative reconstruction technique.    FINDINGS:  The cervical vertebrae are normally aligned. Normal lordotic curvature  of the cervical spine. No findings of acute fracture or traumatic  subluxation. No prevertebral edema. There is no disc height narrowing  at any level. Mild uncinate and facet hypertrophy on the right at C3-4  resulting in moderate right foraminal narrowing, otherwise no  significant foraminal narrowing. No significant spinal canal narrowing  at any level.      No acute abnormality of the paraspinous soft tissues. Multifocal  patchy consolidative right upper lung opacities.      Impression    IMPRESSION:   1.  No acute fracture or traumatic subluxation.  2.  Multifocal patchy ground glass and consolidative opacities in  right upper lung, concerning for infection.    DARSHAN SWANN MD         SYSTEM ID:  [id number]   CTA Chest Abdomen Pelvis w Contrast    Narrative    EXAMINATION: CTA CHEST ABDOMEN PELVIS W  CONTRAST, 10/5/2022 4:37 PM    COMPARISON: Chest radiograph 10/5/2022    HISTORY: AMS evidence of trauma, intubated R/O Dissection    TECHNIQUE:  Imaging protocol: Helical post-contrast CT images of chest, abdomen  and pelvis with reconstructions in 3 planes for angiographic  evaluation. Meds given: Isovue 370 90ml Given.  Acquisition: This CT exam was performed using one or more the  following dose reduction techniques: automated exposure control,  adjustment of the mA and/or kV according to patient size, and/or  iterative reconstruction technique.  Processing: 3D rendering on independent workstation using Maximum  Intensity Projection (MIP) was performed and archived to PACS. 3D  reconstructions are interpreted and reported by supervising  radiologist.    FINDINGS:    CHEST:  LUNG: Diffuse patchy consolidation of the right lower lobe and to a  lesser degree the right upper and right middle lobe. Additional  scattered patchy ground glass consolidative opacities in the right  lung to a lesser degree the left lung.  AIRWAYS: Endotracheal tube terminates the upper thoracic trachea.  Central airways are patent.  PLEURA: No pleural effusion or pneumothorax.  VESSELS: Patent major thoracic vasculature. Ascending aorta is mildly  dilated proximally and measures 4.2 at the proximal arch, no  dilatation of the distal arch or descending aorta. No aortic  dissection, stenosis, or acute abnormality.  HEART: Within normal limits.  LYMPH NODES: Enlarged mediastinal and right greater than left hilar  lymph nodes  THYROID: No thyroid nodules.    ABDOMEN/PELVIS:  LIVER: Normal contour. No suspicious liver lesions. Fatty infiltration  along the falciform ligament.  GALLBLADDER: Within normal limits.  BILE DUCTS: No biliary tract dilatation.  PANCREAS: Within normal limits.  SPLEEN: No splenomegaly. Left upper quadrant splenules.  ADRENALS: Within normal limits.  KIDNEYS/URETERS: Within normal limits.  URINARY BLADDER: Within normal  limits.  REPRODUCTIVE ORGANS: Soft tissue density in the region of the right  inguinal canal measuring 4.2 x 2.1 cm, suspicious for undescended  testicle.    BOWEL: No bowel dilatation or wall thickening. Normal appendix  PERITONEUM/RETROPERITONEUM: No free fluid or free air.  VESSELS: Patent major abdominal vasculature. Aorta appears within  normal limits and demonstrates no dissection, stenosis, or acute  abnormality.  LYMPH NODES: There are no pathologically enlarged lymph nodes.    BONES AND SOFT TISSUES:  No suspicious osseous lesions. Degenerative periarticular changes and  spinal spondylosis.      Impression    IMPRESSION:  1.  Diffuse right lung consolidations and scattered multifocal  pulmonary opacities, likely represent infection, less likely  aspiration, inhalational injury.  2.  No aortic dissection, stenosis, or acute abnormality. Mild  proximal aortic dilatation up to 4.2 cm.  3.  Soft tissue density related to the right inguinal canal, likely  represents undescended right testicle.    DARSHAN SWANN MD         SYSTEM ID:  [id number]   CT Facial Bones without Contrast    Narrative    CT FACIAL BONES WITHOUT CONTRAST    HISTORY: 41 years Male trauma intubated    COMPARISON: None    TECHNIQUE: Axial CT imaging of the facial bones was performed. Coronal  and sagittal reconstructions were obtained.    FINDINGS: An endotracheal tube is present. An oral gastric tube is  present. There is also thickening within the ethmoid air cells and to  a lesser extent the sinuses. There is no evidence of facial fracture.  The orbits are intact.      Impression    IMPRESSION: No evidence of facial fracture.    YOON TAYLOR MD         SYSTEM ID:  [id number]

## 2022-10-06 NOTE — PLAN OF CARE
Patient remains intubated/sedated. Bipap Settings: VC/AC, FIO2 30%, , RR 14, and PEEP 8. 18 g IV in GUSTAVO w/ D5 & 0.9 NS at 100ml/hr. LUF 16 g with prop at 10 and NS at 10. Patient off versed at 0026 per HCP. Fentanyl 50mcg IVP given at 0305 and 0531 d/t patient thrashing in bed and to remain at a RASS of -3 to -4 and CPOT less than 2. VSS besides HR running tachycardic at 110's-120's. Afebrile. Patient's BP hypotensive earlier in shift (See note) and 1000 ml NS bolus was given. Zuluaga remains intact with adequate output for shift. Restraints remain on patient to R and L wrist. No concerns with skin other then a red rash noted across body but HCP is aware and no new orders were placed. Lung sounds clear and equal bilaterally. Oral cares performed q 2 hours, no cough present for writer or RT. ETT remains at 25cm at lips. NG to LIS and remains at 65cm at the mouth. Remains on seizure precautions. Repo q 2 hours. Bed in low position.     0630: writer contacted HCP due to patient waking up and thrashing out in bed and currently not meeting RASS score of -3 to -4.     0645: HCP came to bedside. Order was that propofol could be increased to 75 mcg. HCP said to titrate up, so writer is doing that and patient is now at 20.     Face to face report given with opportunity to observe patient.    Report given to Sanjay Ward RN   10/6/2022  6:52 AM

## 2022-10-06 NOTE — PLAN OF CARE
Right wrist and Left wrist restraints continued 10/6/2022    Clinical Justification: Pulling lines, pulling tubes, and pulling equipment  Less Restrictive Alternative: Repositioning, Pain management, Re-evaluate equipment, Reorientation  Attending Physician Notified: Yes, Attending Physician's Name: Dr. Kerr   New orders placed Yes  Length of Order: 1 Day      Pamela Kenyon, RN

## 2022-10-06 NOTE — PLAN OF CARE
[facility] Inpatient Admission Note:    Patient admitted to 3124/3124-1 at approximately 2030 via bed accompanied by nurse from emergency room . Report received from Niall LAL in SBAR format at 1930 via telephone. Patient transferred to bed via slide board.. Patient is sedated and intubated , unable to assess pain. Will continue to monitor and document as needed.     Inpatient Nursing criteria listed below was met:    Health care directives status obtained and documented: No- no legal decision maker at this time     Patient identifies a surrogate decision maker: No, unable to assess patient sedated and inubated     If initial lactic acid greater than 2.1, repeat lactic acid drawn within one hour of arrival to unit: Yes.    Clergy visit ordered if patient requests: N/A  Unable to assess.     Skin issues/needs documented: Yes    Isolation Patient: no  Fall Prevention Yes: Care plan updated, education given and documented, sticker and magnet in place: Yes    Care Plan initiated: Yes    Education Documented (including assessment): Yes    Patient has discharge needs : unable to assess at this time

## 2022-10-06 NOTE — ED NOTES
Spoke with Dr. Ohara on the phone to clarify if c-collar needs to stay in place or can be removed. Dr. Ohara states c spine is clear and c-collar can be removed. C-collar removed at this time.

## 2022-10-06 NOTE — ED NOTES
Jammie Burgess (mother) was called and updated that patient will be admitted to our ICU.  [phone]  Mother would like a phone call with any changes.

## 2022-10-07 LAB
ALBUMIN SERPL-MCNC: 2.2 G/DL (ref 3.4–5)
ALP SERPL-CCNC: 57 U/L (ref 40–150)
ALT SERPL W P-5'-P-CCNC: 25 U/L (ref 0–70)
ANION GAP SERPL CALCULATED.3IONS-SCNC: 3 MMOL/L (ref 3–14)
AST SERPL W P-5'-P-CCNC: 48 U/L (ref 0–45)
BILIRUB SERPL-MCNC: 0.3 MG/DL (ref 0.2–1.3)
BUN SERPL-MCNC: 18 MG/DL (ref 7–30)
CALCIUM SERPL-MCNC: 7.9 MG/DL (ref 8.5–10.1)
CHLORIDE BLD-SCNC: 110 MMOL/L (ref 94–109)
CK SERPL-CCNC: 1090 U/L (ref 30–300)
CO2 SERPL-SCNC: 28 MMOL/L (ref 20–32)
CREAT SERPL-MCNC: 0.61 MG/DL (ref 0.66–1.25)
CRP SERPL-MCNC: 135 MG/L (ref 0–8)
ERYTHROCYTE [DISTWIDTH] IN BLOOD BY AUTOMATED COUNT: 14.2 % (ref 10–15)
GFR SERPL CREATININE-BSD FRML MDRD: >90 ML/MIN/1.73M2
GLUCOSE BLD-MCNC: 84 MG/DL (ref 70–99)
GLUCOSE BLDC GLUCOMTR-MCNC: 74 MG/DL (ref 70–99)
GLUCOSE BLDC GLUCOMTR-MCNC: 78 MG/DL (ref 70–99)
GLUCOSE BLDC GLUCOMTR-MCNC: 84 MG/DL (ref 70–99)
GLUCOSE BLDC GLUCOMTR-MCNC: 84 MG/DL (ref 70–99)
HCT VFR BLD AUTO: 33 % (ref 40–53)
HGB BLD-MCNC: 11 G/DL (ref 13.3–17.7)
MCH RBC QN AUTO: 29.9 PG (ref 26.5–33)
MCHC RBC AUTO-ENTMCNC: 33.3 G/DL (ref 31.5–36.5)
MCV RBC AUTO: 90 FL (ref 78–100)
PLATELET # BLD AUTO: 144 10E3/UL (ref 150–450)
POTASSIUM BLD-SCNC: 3.7 MMOL/L (ref 3.4–5.3)
PROCALCITONIN SERPL-MCNC: 1.91 NG/ML
PROT SERPL-MCNC: 5.3 G/DL (ref 6.8–8.8)
RBC # BLD AUTO: 3.68 10E6/UL (ref 4.4–5.9)
SODIUM SERPL-SCNC: 141 MMOL/L (ref 133–144)
WBC # BLD AUTO: 6.4 10E3/UL (ref 4–11)

## 2022-10-07 PROCEDURE — 85027 COMPLETE CBC AUTOMATED: CPT | Performed by: INTERNAL MEDICINE

## 2022-10-07 PROCEDURE — 250N000011 HC RX IP 250 OP 636: Performed by: INTERNAL MEDICINE

## 2022-10-07 PROCEDURE — 80053 COMPREHEN METABOLIC PANEL: CPT | Performed by: INTERNAL MEDICINE

## 2022-10-07 PROCEDURE — 82550 ASSAY OF CK (CPK): CPT | Performed by: INTERNAL MEDICINE

## 2022-10-07 PROCEDURE — 999N000157 HC STATISTIC RCP TIME EA 10 MIN

## 2022-10-07 PROCEDURE — 258N000003 HC RX IP 258 OP 636: Performed by: INTERNAL MEDICINE

## 2022-10-07 PROCEDURE — 99232 SBSQ HOSP IP/OBS MODERATE 35: CPT | Performed by: INTERNAL MEDICINE

## 2022-10-07 PROCEDURE — C9113 INJ PANTOPRAZOLE SODIUM, VIA: HCPCS | Performed by: INTERNAL MEDICINE

## 2022-10-07 PROCEDURE — 84145 PROCALCITONIN (PCT): CPT | Performed by: INTERNAL MEDICINE

## 2022-10-07 PROCEDURE — 120N000001 HC R&B MED SURG/OB

## 2022-10-07 PROCEDURE — 82040 ASSAY OF SERUM ALBUMIN: CPT | Performed by: INTERNAL MEDICINE

## 2022-10-07 PROCEDURE — 36415 COLL VENOUS BLD VENIPUNCTURE: CPT | Performed by: INTERNAL MEDICINE

## 2022-10-07 PROCEDURE — 86140 C-REACTIVE PROTEIN: CPT | Performed by: INTERNAL MEDICINE

## 2022-10-07 RX ORDER — SODIUM CHLORIDE 9 MG/ML
INJECTION, SOLUTION INTRAVENOUS CONTINUOUS
Status: DISCONTINUED | OUTPATIENT
Start: 2022-10-07 | End: 2022-10-08 | Stop reason: HOSPADM

## 2022-10-07 RX ADMIN — PANTOPRAZOLE SODIUM 40 MG: 40 INJECTION, POWDER, FOR SOLUTION INTRAVENOUS at 19:43

## 2022-10-07 RX ADMIN — SODIUM CHLORIDE: 9 INJECTION, SOLUTION INTRAVENOUS at 16:03

## 2022-10-07 RX ADMIN — AMPICILLIN SODIUM AND SULBACTAM SODIUM 3 G: 2; 1 INJECTION, POWDER, FOR SOLUTION INTRAMUSCULAR; INTRAVENOUS at 19:44

## 2022-10-07 RX ADMIN — AMPICILLIN SODIUM AND SULBACTAM SODIUM 3 G: 2; 1 INJECTION, POWDER, FOR SOLUTION INTRAMUSCULAR; INTRAVENOUS at 14:09

## 2022-10-07 RX ADMIN — AMPICILLIN SODIUM AND SULBACTAM SODIUM 3 G: 2; 1 INJECTION, POWDER, FOR SOLUTION INTRAMUSCULAR; INTRAVENOUS at 08:08

## 2022-10-07 RX ADMIN — AMPICILLIN SODIUM AND SULBACTAM SODIUM 3 G: 2; 1 INJECTION, POWDER, FOR SOLUTION INTRAMUSCULAR; INTRAVENOUS at 01:56

## 2022-10-07 ASSESSMENT — ACTIVITIES OF DAILY LIVING (ADL)
ADLS_ACUITY_SCORE: 25
ADLS_ACUITY_SCORE: 25
ADLS_ACUITY_SCORE: 24
ADLS_ACUITY_SCORE: 25
ADLS_ACUITY_SCORE: 24
ADLS_ACUITY_SCORE: 25
ADLS_ACUITY_SCORE: 24
ADLS_ACUITY_SCORE: 24
ADLS_ACUITY_SCORE: 25
ADLS_ACUITY_SCORE: 24

## 2022-10-07 NOTE — PLAN OF CARE
Goal Outcome Evaluation:        Pt oriented, remains lethargic but cooperative. VSS on 2L-desats to 88% while asleep on RA. HR SR 80-ST 100s. Denies pain ex intermittent sore throat. PIV x2, on unasyn. Voiding per urinal. Slight gen edema noted. C/o feeling weak. SBA. Regular diet, tolerating snacks overnight.       Face to face report given with opportunity to observe patient.    Report given to LUKASZ Rodriguez RN   10/7/2022  7:09 AM

## 2022-10-07 NOTE — PROGRESS NOTES
"[facility]  Hospitalist Progress Note          Assessment and Plan:       1.  Acute respiratory failure with hypoxia.  He is intubated and being ventilated. Propofol for sedation, \"easy\" to ventilate  - ventilator bundles   -10/6: Patient remains intubated.  Peak airway pressures are low, tidal volumes good.  He is on 30% FiO2.  Is on propofol only for sedation.  Arterial blood gas   great patient 7.40 PCO2 42 PO2 79 bicarb 26.  He does have fairly significant infiltrate on the right side due to presumed aspiration.  Will continue to use hopefully only propofol for sedation.  Do weaning parameters weaning trials sedation vacation and see how he does.  He does awaken to voice does follow commands.  -10/7: Extubated yesterday.  Awakens to voice and follows commands.          2. Possible aspiration pneumonitis, vs blood aspiration.   - will monitor with daily XR  - ED gave antibiotics. Will hold antibiotics  - will use aggressive pulmonary toile - nebs and suctioning R lung each time nebs given GERD.    -Continue home medications  -10/6: No fevers procalcitonin 4.73 CRP was 159.  Decided to restart the antibiotics we will continue with IV Zosyn.  -10/7: Continue Antibiotics     3. Epistaxis. Could be due to trauma (not witnessed).  - monitor.   - no facial bone fractures  -10/6: No evidence of ongoing bleeding.  Hemoglobin is stable at this time.  -10/7: No evidence of epistaxis       4. Amphetamine/methamphetamine use.   - long lasting problem  - consider referral when extubated  -10/6: This is a long-term issue problem.  Patient is up here visiting family/living with family.  Will address whether or not interested in treatment when extubated  -10/7: Rule 25 arranged for Monday       5. KADE. Present on admission.  - consider behavioral health consult to prevent post extubation behavioral problems  -10/7: No behavioral problems, but patient remains lethargic.      6.  Elevated CK: Rhabdomyolysis  Now 1877. " " Will repeat in am.      7.  Cardiac: No dysrhythmias at all hemodynamically stable currently. IVFs       8.  Renal: Zuluaga in place urine output is adequate.  Renal function normal.  Electrolytes within acceptable range also.        Diet: NPO for Medical/Clinical Reasons Except for: No Exceptions  Fluids: D5 normal saline 100 cc an hour     DVT Prophylaxis: Pneumatic Compression Devices  Code Status: Full Code     Disposition: Expected discharge in 2-3 days once extubated and aspiration pneumonia adequately treated.     Clinically Significant Risk Factors Present on Admission               # Overweight: Estimated body mass index is 29.42 kg/m  as calculated from the following:    Height as of this encounter: 1.88 m (6' 2.02\").    Weight as of this encounter: 104 kg (229 lb 4.5 oz).              Interval History:     Patient desats to 88% while sleeping.  Vitals otherwise stable. Responsive but lethargic.  No acute issues overnight and successfully extubated yesterday.               Medications:       ampicillin-sulbactam  3 g Intravenous Q6H     [Held by provider] escitalopram  10 mg Oral Daily     [Held by provider] escitalopram  5 mg Oral Daily     pantoprazole  40 mg Intravenous Q24H                  Physical Exam:     Vitals:    10/07/22 0512 10/07/22 0744 10/07/22 0800 10/07/22 0900   BP:   108/65    BP Location:   Right arm    Pulse:   84    Resp:   21    Temp:   98.2  F (36.8  C)    TempSrc:   Tympanic    SpO2:  95% 96% 92%   Weight: 104 kg (229 lb 4.5 oz)      Height:             Vital Sign Ranges  Temperature Temp  Av.2  F (37.3  C)  Min: 98.2  F (36.8  C)  Max: 99.8  F (37.7  C)   Blood pressure Systolic (24hrs), Av , Min:93 , Max:129        Diastolic (24hrs), Av, Min:54, Max:80      Pulse Pulse  Av.9  Min: 84  Max: 118   Respirations Resp  Av  Min: 16  Max: 23   Pulse oximetry SpO2  Av.3 %  Min: 92 %  Max: 99 %         Intake/Output Summary (Last 24 hours) at 10/7/2022 " 1213  Last data filed at 10/7/2022 0905  Gross per 24 hour   Intake 2160 ml   Output 2050 ml   Net 110 ml       General:  Alert and Orientated.  NAD. Somnolent  Heart:  RRR, S1 S2, No murmurs, no rubs, no gallops.  Resp: CTA bilaterally.  No wheezes, no rhonchi, no crackles.  Abd: Soft/NT/ND/Positve BS.  Ext: No edema noted in either lower extremity  Neuro:  No lateralizing deficits noted    Peripheral IV 10/05/22 Anterior;Distal;Right Upper arm (Active)   Site Assessment WDL 10/07/22 0800   Line Status Saline locked 10/07/22 0800   Phlebitis Scale 0-->no symptoms 10/07/22 0800   Infiltration Scale 0 10/07/22 0600   Infiltration Site Treatment Method  None 10/06/22 0500   Number of days: 2       Peripheral IV 10/05/22 Left Upper forearm (Active)   Site Assessment [facility] 10/07/22 0800   Line Status Saline locked 10/07/22 0800   Dressing Intervention New dressing  10/05/22 1535   Phlebitis Scale 0-->no symptoms 10/07/22 0800   Infiltration Scale 0 10/07/22 0800   Infiltration Site Treatment Method  None 10/06/22 0500   Number of days: 2     No line/device             Data:     Lab Results   Component Value Date    WBC 6.4 10/07/2022    HGB 11.0 (L) 10/07/2022    HCT 33.0 (L) 10/07/2022     (L) 10/07/2022     10/07/2022    POTASSIUM 3.7 10/07/2022    CHLORIDE 110 (H) 10/07/2022    CO2 28 10/07/2022    BUN 18 10/07/2022    CR 0.61 (L) 10/07/2022    GLC 78 10/07/2022    NTBNPI 220 10/05/2022    AST 48 (H) 10/07/2022    ALT 25 10/07/2022    ALKPHOS 57 10/07/2022    BILITOTAL 0.3 10/07/2022    ABILIO <10 (L) 10/05/2022    INR 0.95 10/05/2022     Lactic Acid   Date Value Ref Range Status   10/06/2022 1.3 0.7 - 2.0 mmol/L Final   10/05/2022 2.1 (H) 0.7 - 2.0 mmol/L Final   10/05/2022 2.0 0.7 - 2.0 mmol/L Final       Gulshan Gamble DO

## 2022-10-07 NOTE — PROGRESS NOTES
Received call from sister, La she indicates that Lino will be staying with her at discharge.  She will be in to pick him up when he is ready.

## 2022-10-07 NOTE — DISCHARGE INSTRUCTIONS
Rule 25 via phone consult scheduled for 10:00 am, on Monday, October 10th with [facility].  If you need to reschedule, please call [phone].

## 2022-10-07 NOTE — PROGRESS NOTES
"CLINICAL NUTRITION SERVICES  -  ASSESSMENT NOTE    Lino Nava : Admission Nutrition Risk Screen - 24-33lb weight loss, reduced appetite/intake    41 yom admitted for acute respiratory failure with hypoxia. Past medial hx includes KADE, MDD, substance abuse. Significant weight loss noted over the last several months. Reduced intake/appetite likely related to substance abuse. Intake has been adequate since extubated yesterday.    Diet Order: Regular  Intake: 3 meals with 100% intake     Height: 6' 2.016\"  Weight: 229 lbs 4.45 oz  Body mass index is 29.42 kg/m .  Weight Status:  Overweight BMI 25-29.9  Weight History: 54lb (14%) weight loss in 7 months  Wt Readings from Last 10 Encounters:   10/07/22 104 kg (229 lb 4.5 oz)   03/13/22 128.5 kg (283 lb 6.4 oz)   12/10/21 128.9 kg (284 lb 3.2 oz)   08/27/21 134.7 kg (297 lb)   05/04/20 120.2 kg (265 lb)   06/21/19 111.2 kg (245 lb 4 oz)   04/22/19 116.6 kg (257 lb)   01/22/19 110.2 kg (243 lb)   01/03/19 109.3 kg (241 lb)   06/29/18 114.8 kg (253 lb)   04/09/18 118.8 kg (262 lb)      Weight used to calculate needs: 88kg max ibw  Estimated Energy Needs: 8989-9435 kcals (25-30 Kcal/Kg)   Estimated Protein Needs:  grams protein (1-1.2 g pro/Kg)    MALNUTRITION:  % Weight Loss:  > 10% in 6 months (severe malnutrition)  % Intake:  </= 75% for >/= 1 month (severe malnutrition)    Malnutrition Diagnosis: Severe malnutrition  In Context of:  Chronic illness or disease  Environmental or social circumstances    NUTRITION DIAGNOSIS:  Malnutrition related to inadequate energy/protein intake as evidenced by 14% weight loss in the last 7 months.    NUTRITION RECOMMENDATIONS  - Encourage intake as needed  - suggest daily multivitamin/mineral supplement  - monitor electrolytes    MONITORING AND EVALUATION:  RD will monitor intake, weight, labs        "

## 2022-10-07 NOTE — PROGRESS NOTES
Rule 25 via phone consult scheduled for 10:00 am, on Monday, October 10th with [facility].  Lino updated.  He plans to stay at his mom's.

## 2022-10-07 NOTE — PLAN OF CARE
No acute changes. Lethargic, slept most of the day. Arouses quickly to voice. Alert and oriented. Answers all questions appropriately. Remains hemodynamically stable. Desats to 88% on room air while sleeping but remains >92% when awake. Lungs dim, with intermittent inspiratory wheezes on the right. Audible bowel sounds. Ate breakfast and dinner without difficulty. Blood sugars remain in 70s-80s when assessed. Urinal voiding - kaleb color output. Up SBA at bedside. No new skin issues. Denies pain. Only complaint all day was an increase in weakness and not feeling strong enough to ambulate.     Refused to ambulate around room despite lots of encouragement from myself and family. Refused multiple offers for a shower and assistance with hygiene.     Had a long conversation with patient regarding his acceptance to the rehab facility and he is agreeable to go. He asked many questions regarding what happened . and how bad of shape he was in.     2 PIVs. IVF infusing. IV antibiotics given as ordered.     Alarms on for safety. Call light remains within reach and makes needs known.     Face to face report given with opportunity to observe patient.    Report given to LUKASZ Kruse RN   10/7/2022  7:12 PM

## 2022-10-07 NOTE — PHARMACY
Pharmacy Antimicrobial Stewardship Assessment     Current Antimicrobial Therapy:  Anti-infectives (From now, onward)    Start     Dose/Rate Route Frequency Ordered Stop    10/06/22 0730  ampicillin-sulbactam (UNASYN) 3 g in sodium chloride 0.9 % 100 mL intermittent infusion         3 g  200-400 mL/hr over 15-30 Minutes Intravenous EVERY 6 HOURS 10/06/22 0647          Indication: aspiration pneumonia    Days of Therapy: 3     Pertinent Labs:  Recent Labs   Lab Test 10/07/22  0447 10/06/22  0602 10/05/22  1538   WBC 6.4 7.5 2.7*     Recent Labs   Lab Test 10/07/22  0447 10/06/22  0600 10/06/22  0442 10/05/22  2139   LACT  --  1.3  --  2.1*   .0*  --  159.0*  --    PCAL 1.91*  --  4.73*  --         Temperature:  Temp (24hrs), Av.1  F (37.3  C), Min:98.2  F (36.8  C), Max:99.8  F (37.7  C)    Culture Results:   10/5/22: COVID = negative     Recommendations/Interventions:  None at this time    Shelli Sahu, [facility]  2022

## 2022-10-07 NOTE — PLAN OF CARE
Wife Karin called and said she found Lino a bed at the [facility] in [place]. The bed is on hold for one week.     Facility number is [phone]    Patient will need transportation down to the [facility] upon discharge. She is unable to transfer him herself.

## 2022-10-08 VITALS
TEMPERATURE: 97.7 F | OXYGEN SATURATION: 92 % | HEIGHT: 74 IN | SYSTOLIC BLOOD PRESSURE: 105 MMHG | RESPIRATION RATE: 21 BRPM | HEART RATE: 71 BPM | WEIGHT: 226.85 LBS | DIASTOLIC BLOOD PRESSURE: 65 MMHG | BODY MASS INDEX: 29.11 KG/M2

## 2022-10-08 LAB
ALBUMIN SERPL-MCNC: 2.3 G/DL (ref 3.4–5)
ALP SERPL-CCNC: 63 U/L (ref 40–150)
ALT SERPL W P-5'-P-CCNC: 22 U/L (ref 0–70)
ANION GAP SERPL CALCULATED.3IONS-SCNC: 4 MMOL/L (ref 3–14)
AST SERPL W P-5'-P-CCNC: 32 U/L (ref 0–45)
BASOPHILS # BLD AUTO: 0 10E3/UL (ref 0–0.2)
BASOPHILS NFR BLD AUTO: 0 %
BILIRUB SERPL-MCNC: 0.3 MG/DL (ref 0.2–1.3)
BUN SERPL-MCNC: 8 MG/DL (ref 7–30)
CALCIUM SERPL-MCNC: 8.3 MG/DL (ref 8.5–10.1)
CHLORIDE BLD-SCNC: 110 MMOL/L (ref 94–109)
CK SERPL-CCNC: 661 U/L (ref 30–300)
CO2 SERPL-SCNC: 27 MMOL/L (ref 20–32)
CREAT SERPL-MCNC: 0.58 MG/DL (ref 0.66–1.25)
CRP SERPL-MCNC: 66.7 MG/L (ref 0–8)
EOSINOPHIL # BLD AUTO: 0.1 10E3/UL (ref 0–0.7)
EOSINOPHIL NFR BLD AUTO: 2 %
ERYTHROCYTE [DISTWIDTH] IN BLOOD BY AUTOMATED COUNT: 13.8 % (ref 10–15)
GFR SERPL CREATININE-BSD FRML MDRD: >90 ML/MIN/1.73M2
GLUCOSE BLD-MCNC: 91 MG/DL (ref 70–99)
GLUCOSE BLDC GLUCOMTR-MCNC: 76 MG/DL (ref 70–99)
HCT VFR BLD AUTO: 34.6 % (ref 40–53)
HGB BLD-MCNC: 11.5 G/DL (ref 13.3–17.7)
IMM GRANULOCYTES # BLD: 0 10E3/UL
IMM GRANULOCYTES NFR BLD: 0 %
LYMPHOCYTES # BLD AUTO: 1 10E3/UL (ref 0.8–5.3)
LYMPHOCYTES NFR BLD AUTO: 20 %
MCH RBC QN AUTO: 29 PG (ref 26.5–33)
MCHC RBC AUTO-ENTMCNC: 33.2 G/DL (ref 31.5–36.5)
MCV RBC AUTO: 87 FL (ref 78–100)
MONOCYTES # BLD AUTO: 0.3 10E3/UL (ref 0–1.3)
MONOCYTES NFR BLD AUTO: 6 %
NEUTROPHILS # BLD AUTO: 3.4 10E3/UL (ref 1.6–8.3)
NEUTROPHILS NFR BLD AUTO: 72 %
NRBC # BLD AUTO: 0 10E3/UL
NRBC BLD AUTO-RTO: 0 /100
PLATELET # BLD AUTO: 179 10E3/UL (ref 150–450)
POTASSIUM BLD-SCNC: 3.7 MMOL/L (ref 3.4–5.3)
PROCALCITONIN SERPL-MCNC: 0.91 NG/ML
PROT SERPL-MCNC: 5.9 G/DL (ref 6.8–8.8)
RBC # BLD AUTO: 3.96 10E6/UL (ref 4.4–5.9)
SODIUM SERPL-SCNC: 141 MMOL/L (ref 133–144)
WBC # BLD AUTO: 4.8 10E3/UL (ref 4–11)

## 2022-10-08 PROCEDURE — 999N000157 HC STATISTIC RCP TIME EA 10 MIN

## 2022-10-08 PROCEDURE — 82550 ASSAY OF CK (CPK): CPT | Performed by: INTERNAL MEDICINE

## 2022-10-08 PROCEDURE — 84145 PROCALCITONIN (PCT): CPT | Performed by: INTERNAL MEDICINE

## 2022-10-08 PROCEDURE — 86140 C-REACTIVE PROTEIN: CPT | Performed by: INTERNAL MEDICINE

## 2022-10-08 PROCEDURE — 99238 HOSP IP/OBS DSCHRG MGMT 30/<: CPT | Performed by: INTERNAL MEDICINE

## 2022-10-08 PROCEDURE — 36415 COLL VENOUS BLD VENIPUNCTURE: CPT | Performed by: INTERNAL MEDICINE

## 2022-10-08 PROCEDURE — 85014 HEMATOCRIT: CPT | Performed by: INTERNAL MEDICINE

## 2022-10-08 PROCEDURE — 80053 COMPREHEN METABOLIC PANEL: CPT | Performed by: INTERNAL MEDICINE

## 2022-10-08 PROCEDURE — 258N000003 HC RX IP 258 OP 636: Performed by: INTERNAL MEDICINE

## 2022-10-08 PROCEDURE — 250N000011 HC RX IP 250 OP 636: Performed by: INTERNAL MEDICINE

## 2022-10-08 RX ADMIN — AMPICILLIN SODIUM AND SULBACTAM SODIUM 3 G: 2; 1 INJECTION, POWDER, FOR SOLUTION INTRAMUSCULAR; INTRAVENOUS at 07:56

## 2022-10-08 RX ADMIN — AMPICILLIN SODIUM AND SULBACTAM SODIUM 3 G: 2; 1 INJECTION, POWDER, FOR SOLUTION INTRAMUSCULAR; INTRAVENOUS at 01:42

## 2022-10-08 RX ADMIN — SODIUM CHLORIDE: 9 INJECTION, SOLUTION INTRAVENOUS at 09:53

## 2022-10-08 RX ADMIN — SODIUM CHLORIDE: 9 INJECTION, SOLUTION INTRAVENOUS at 00:10

## 2022-10-08 ASSESSMENT — ACTIVITIES OF DAILY LIVING (ADL)
ADLS_ACUITY_SCORE: 24

## 2022-10-08 NOTE — PROGRESS NOTES
"[facility]  Hospitalist Progress Note          Assessment and Plan:       1.  Acute respiratory failure with hypoxia.  He is intubated and being ventilated. Propofol for sedation, \"easy\" to ventilate  - ventilator bundles   -10/6: Patient remains intubated.  Peak airway pressures are low, tidal volumes good.  He is on 30% FiO2.  Is on propofol only for sedation.  Arterial blood gas   great patient 7.40 PCO2 42 PO2 79 bicarb 26.  He does have fairly significant infiltrate on the right side due to presumed aspiration.  Will continue to use hopefully only propofol for sedation.  Do weaning parameters weaning trials sedation vacation and see how he does.  He does awaken to voice does follow commands.  -10/7: Extubated yesterday.  Awakens to voice and follows commands.   -10/8: Desats while sleeping into high 80s.  Likely some HOLGER.  Other respiratory status stable.          2. Possible aspiration pneumonitis, vs blood aspiration.   - will monitor with daily XR  - ED gave antibiotics. Will hold antibiotics  - will use aggressive pulmonary toile - nebs and suctioning R lung each time nebs given GERD.    -Continue home medications  -10/6: No fevers procalcitonin 4.73 CRP was 159.  Decided to restart the antibiotics we will continue with IV Zosyn.  -10/7: Continue Antibiotics  -10/8: Continue antibiotics, discharge on Augmentin     3. Epistaxis. Could be due to trauma (not witnessed).  - monitor.   - no facial bone fractures  -10/6: No evidence of ongoing bleeding.  Hemoglobin is stable at this time.  -10/7: No evidence of epistaxis         4. Amphetamine/methamphetamine use.   - long lasting problem  - consider referral when extubated  -10/6: This is a long-term issue problem.  Patient is up here visiting family/living with family.  Will address whether or not interested in treatment when extubated  -10/7: Rule 25 arranged for Monday   -10/8: Rule 25 Monday   -10/8: Stable     5. KADE. Present on admission.  - " "consider behavioral health consult to prevent post extubation behavioral problems  -10/7: No behavioral problems, but patient remains lethargic.  -10/8: Stable but lethargic       6.  Elevated CK: Rhabdomyolysis resolving.   Now 661.       7.  Cardiac: No dysrhythmias at all hemodynamically stable currently. IVFs       8.  Renal: Stable        Diet: NPO for Medical/Clinical Reasons Except for: No Exceptions       DVT Prophylaxis: Pneumatic Compression Devices  Code Status: Full Code     Disposition: Possible today.  Ambulation encouraged.      Clinically Significant Risk Factors Present on Admission               # Overweight: Estimated body mass index is 29.11 kg/m  as calculated from the following:    Height as of this encounter: 1.88 m (6' 2.02\").    Weight as of this encounter: 102.9 kg (226 lb 13.7 oz).    # Severe Malnutrition: based on nutrition assessment           Interval History:     No acute events overnight.  Vitals stable.  Patient reports weakness.                Medications:       ampicillin-sulbactam  3 g Intravenous Q6H     [Held by provider] escitalopram  10 mg Oral Daily     [Held by provider] escitalopram  5 mg Oral Daily     pantoprazole  40 mg Intravenous Q24H                  Physical Exam:     Vitals:    10/07/22 1946 10/07/22 2307 10/08/22 0438 10/08/22 0625   BP: 135/68 115/71 127/67    BP Location:  Right arm Right arm    Patient Position:  Right side Left side    Cuff Size:  Adult Regular Adult Regular    Pulse: 88 81 77    Resp: 16 16 22    Temp: 98.7  F (37.1  C) 99.8  F (37.7  C) 97.2  F (36.2  C)    TempSrc: Tympanic Tympanic Tympanic    SpO2: 94% 92% (!) 88%    Weight:    102.9 kg (226 lb 13.7 oz)   Height:             Vital Sign Ranges  Temperature Temp  Av.5  F (36.9  C)  Min: 97.2  F (36.2  C)  Max: 99.8  F (37.7  C)   Blood pressure Systolic (24hrs), Av , Min:107 , Max:135        Diastolic (24hrs), Av, Min:67, Max:72      Pulse Pulse  Av  Min: 75  Max: 88 "   Respirations Resp  Av.2  Min: 14  Max: 22   Pulse oximetry SpO2  Av.5 %  Min: 88 %  Max: 94 %         Intake/Output Summary (Last 24 hours) at 10/8/2022 0810  Last data filed at 10/8/2022 0630  Gross per 24 hour   Intake 3464 ml   Output 2100 ml   Net 1364 ml       General:  Alert and Orientated.  NAD.  Heart:  RRR, S1 S2, No murmurs, no rubs, no gallops.  Resp: CTA bilaterally.  No wheezes, no rhonchi, no crackles.  Abd: Soft/NT/ND/Positve BS.  Ext: No edema noted in either lower extremity  Neuro:  No focal Neurologic deficits noted.    Peripheral IV 10/05/22 Anterior;Distal;Right Upper arm (Active)   Site Assessment WDL 10/07/22 2307   Line Status Infusing 10/07/22 2307   Phlebitis Scale 0-->no symptoms 10/07/22 2307   Infiltration Scale 0 10/07/22 2307   Infiltration Site Treatment Method  None 10/07/22 2307   Number of days: 3       Peripheral IV 10/05/22 Left Upper forearm (Active)   Site Assessment [facility] 10/07/22 2307   Line Status Saline locked 10/07/22 2307   Dressing Intervention New dressing  10/05/22 1535   Phlebitis Scale 0-->no symptoms 10/07/22 2307   Infiltration Scale 0 10/07/22 2307   Infiltration Site Treatment Method  None 10/07/22 2307   Number of days: 3     No line/device             Data:     Lab Results   Component Value Date    WBC 4.8 10/08/2022    HGB 11.5 (L) 10/08/2022    HCT 34.6 (L) 10/08/2022     10/08/2022     10/08/2022    POTASSIUM 3.7 10/08/2022    CHLORIDE 110 (H) 10/08/2022    CO2 27 10/08/2022    BUN 8 10/08/2022    CR 0.58 (L) 10/08/2022    GLC 91 10/08/2022    NTBNPI 220 10/05/2022    AST 32 10/08/2022    ALT 22 10/08/2022    ALKPHOS 63 10/08/2022    BILITOTAL 0.3 10/08/2022    ABILIO <10 (L) 10/05/2022    INR 0.95 10/05/2022     Lactic Acid   Date Value Ref Range Status   10/06/2022 1.3 0.7 - 2.0 mmol/L Final   10/05/2022 2.1 (H) 0.7 - 2.0 mmol/L Final   10/05/2022 2.0 0.7 - 2.0 mmol/L Final       Gulshan Gamble, DO

## 2022-10-08 NOTE — CARE PLAN
Patient discharged at 1:32 PM via wheel chair accompanied by other:Sister in law La and staff. Prescriptions filled and sent with patient upon discharge. All belongings sent with patient.     Discharge instructions reviewed with patient and La. Listed belongings gathered and returned to patient.     Patient discharged to La's home.     [facility]  Follow up appointment made:  plan to admit to treatment, family aware of plan and pt's wishes to go to rehab in the [facility] to be closer to kids  Home medications returned to patient: N/A  Patient reports pain was well managed at discharge: Yes

## 2022-10-08 NOTE — PLAN OF CARE
"/68   Pulse 88   Temp 98.7  F (37.1  C) (Tympanic)   Resp 16   Ht 1.88 m (6' 2.02\")   Wt 104 kg (229 lb 4.5 oz)   SpO2 94%   BMI 29.42 kg/m  .  Denies shortness of breath and RR 16. Alert and orientated and calls for assist appropriately. Report  given to Ed LAL        "

## 2022-10-08 NOTE — DISCHARGE SUMMARY
Service Date: 10/08/2022  Date of Admission  10/5/22  Date of Discharge  10/8/22    ADMISSION DIAGNOSIS:  Altered mental status.    DISCHARGE DIAGNOSES:    1.  Drug overdose (methamphetamines).  2.  Acute respiratory failure with hypoxia requiring intubation.  3.  Aspiration pneumonitis.  4.  Epistaxis.  5.  Rhabdomyolysis, resolving.    PROCEDURES/CONSULTATIONS:   1.  ICU stay.  2.  Intubation with ventilator support.  3.  Chest x-ray.  4.  CT of the face.  5.  CTA of the chest, abdomen and pelvis.  6.  CT of the cervical spine.  7.  CT head without contrast.    PENDING AT TIME OF DISCHARGE:  None.    SPECIMENS:  None.    DISPOSITION:  To home, self-care with followup rule 25 scheduled for 10/10/2022.    HISTORY OF PRESENT ILLNESS:  Please see admission H and P.    PAST MEDICAL HISTORY:  Please see admission H and P.    HOSPITAL COURSE:  Mr. Burgess is a 41-year-old male who presented with altered mental status secondary to methamphetamine overdose.  Subsequently, he did require intubation and ventilator support due to acute respiratory failure with hypoxia.  In addition, numerous imaging studies were obtained including head, CT of the cervical spine, CTA of the chest and CT of the facial bones along with chest x-ray.  There were no fractures identified; however, he did have opacities in the right lung consistent with aspiration pneumonitis.  The patient was treated with IV antibiotics throughout his hospitalization and will be discharged with Augmentin 825/125 mg p.o. b.i.d. for 4 additional days.    After remaining on the ventilator overnight; upon his initial presentation, he was extubated without any complications.  He remained somnolent; however, was noted to have an elevated CK level, which reached 1877.  At the time of discharge, his CK is down to 661.  There was no associated acute renal failure with this.  The patient did fine regarding his respiratory status after extubation; however, he does have some  slight desaturation to about 88% at times while sleeping, raising the question as to obstructive sleep apnea.  Possibility of outpatient followup for this is warranted.    The patient does have a Rule 25 scheduled for 10/10/2022 upon discharge.  It is recommended that he establish primary care.  The patient will be discharged under his own care and decision making at this time.    DISCHARGE PHYSICAL EXAM:    VITAL SIGNS:  Blood pressure is 105/65, respirations 21, O2 sat 92% on room air, heart rate 71, temperature 97.7.  GENERAL:  The patient is alert and oriented and arousable; however, lethargic.  HEART:  Regular rate and rhythm.  S1, S2.  No murmurs, rubs, or gallops.  LUNGS:  Clear in the upper fields bilaterally with diminished breath sounds in the bases.  ABDOMEN:  Positive bowel sounds.  EXTREMITIES:  Without any edema.  NEUROLOGIC:  No focal or lateralizing deficits were noted.    MEDICATIONS AT THE TIME OF DISCHARGE: Augmentin 875/125 p.o. b.i.d. for 4 additional days, Lexapro 20 mg daily, Vistaril 50 mg daily.    ADDITIONAL DISCHARGE INSTRUCTIONS    CODE STATUS:  FULL.    DIET:  Regular.    ACTIVITY:  As tolerated.    FOLLOWUP APPOINTMENTS AND REFERRALS:    1.  The patient to follow up with the establishment of primary care.  2.  Patient is to attend his Rule 25 on 10/10/2022.    Gulshan Gamble DO        D: 10/08/2022   T: 10/08/2022   MT: ZORAN    Name:     ROSITA GIVENS  MRN:      [id number]        Account:      [id number]   :      1980           Service Date: 10/08/2022       Document: [id number]

## 2022-10-08 NOTE — PLAN OF CARE
"Reason for hospital stay:  Acute Respiratory Failure  Living situation PTA: Home with brother and sister-in-law  Most recent vitals:/67 (BP Location: Right arm, Patient Position: Left side, Cuff Size: Adult Regular)   Pulse 77   Temp 97.2  F (36.2  C) (Tympanic)   Resp 22   Ht 1.88 m (6' 2.02\")   Wt 104 kg (229 lb 4.5 oz)   SpO2 (!) 88%   BMI 29.42 kg/m    Pain Management:  Denies pain  LOC:  A&O x 4, lethargic  Cardiac:  HR&R regular  Respiratory:  Lung sounds diminished with expiratory wheezes in the RUL, RML  GI:  Active BS x 4  :  WDL, uses bedside urinal   Skin Issues:  warm, dry, and intact    IVF:  NS @ 125 mL/ hr  ABX:  UNASYN q 6 hrs    Nutrition: reg diet w/ fair appetite  Activity: Generalized weakness, SBA, patient lethargic this shift and slept through the night.  Safety:  bed in lowest position, call light and personal items within reach, room near nurses station, and frequent rounding.  Face to face report given with opportunity to observe patient.    Report given to Tracy Martínez RN   10/8/2022  7:02 AM              "

## 2022-10-08 NOTE — CARE PLAN
"Karin called and asked for update. Stated she has the  Rehab bed for him when he gets discharged. She is unsure if she can come get him for transport. Karin wanted to be transferred into pt's room to talk to him, which was done, but pt stated he didn't want to talk because he \"is sleeping\".    Mother, Jammie, asking about discharge plan. Mother stated she could possible provide transportation.  "

## 2022-10-08 NOTE — PHARMACY
Pharmacy Antimicrobial Stewardship Assessment     Current Antimicrobial Therapy:  Anti-infectives (From now, onward)    Start     Dose/Rate Route Frequency Ordered Stop    10/06/22 0730  ampicillin-sulbactam (UNASYN) 3 g in sodium chloride 0.9 % 100 mL intermittent infusion         3 g  200-400 mL/hr over 15-30 Minutes Intravenous EVERY 6 HOURS 10/06/22 0647            Indication: aspiration pneumonia    Days of Therapy: 4     Pertinent Labs:    Recent labs: (last 7 days)     10/05/22  1538 10/05/22  2139 10/06/22  0442 10/06/22  0600 10/06/22  0602 10/07/22  0447 10/08/22  0540   WBC 2.7*  --   --   --  7.5 6.4 4.8   LACT 2.0 2.1*  --  1.3  --   --   --    CRP  --   --  159.0*  --   --  135.0* 66.7*   PCAL  --   --  4.73*  --   --  1.91* 0.91*       Temperature:  Temp (24hrs), Av.3  F (36.8  C), Min:97.2  F (36.2  C), Max:99.8  F (37.7  C)      Culture Results:       Recommendations/Interventions:  1. None at this time.    Katie Wang, [facility]  2022

## 2022-10-08 NOTE — CARE PLAN
"Spoke with Karin in regards to La and Balta being upset the pt is not choosing [facility] for rehab. She feels they may try and intervene. She states, \"La has already lost 2 family members to drugs and she isn't about to lose another.\" They are aware at this time that Lino is his own decision maker and can go to the facility of his choosing. Karin is concerned they may come and \"cause a scene\". Primary RN/Provider updated.   "

## 2023-01-15 ENCOUNTER — HEALTH MAINTENANCE LETTER (OUTPATIENT)
Age: 43
End: 2023-01-15

## 2023-08-15 NOTE — LETTER
[facility] CARE COORDINATION    April 30, 2020    Lino Nava  [address]  [facility] [address]      Dear Lino,    I am a clinic community health worker who works with Bill Antunez MD at [facility]. I have been trying to reach you recently to introduce Clinic Care Coordination and to see if there was anything I could assist you with.  Below is a description of clinic care coordination and how I can further assist you.      The clinic care coordination team is made up of a registered nurse,  and community health worker who understand the health care system. The goal of clinic care coordination is to help you manage your health and improve access to the health care system in the most efficient manner. The team can assist you in meeting your health care goals by providing education, coordinating services, strengthening the communication among your providers and supporting you with any resource needs.    Please feel free to contact the Community Health Worker at [phone] with any questions or concerns. We are focused on providing you with the highest-quality healthcare experience possible and that all starts with you.     Sincerely,     Carla Kearney  Community Health Worker   [facility]  [address] [place] [address]  [facility]  [address] [place] [address]  [facility]  [address] [place] [address]  [email or web address]     (3) no apparent problem

## 2024-02-17 ENCOUNTER — HEALTH MAINTENANCE LETTER (OUTPATIENT)
Age: 44
End: 2024-02-17

## 2024-09-25 ENCOUNTER — TRANSFERRED RECORDS (OUTPATIENT)
Dept: HEALTH INFORMATION MANAGEMENT | Facility: CLINIC | Age: 44
End: 2024-09-25

## 2024-11-26 ENCOUNTER — TRANSFERRED RECORDS (OUTPATIENT)
Dept: HEALTH INFORMATION MANAGEMENT | Facility: CLINIC | Age: 44
End: 2024-11-26